# Patient Record
Sex: FEMALE | Race: OTHER | HISPANIC OR LATINO
[De-identification: names, ages, dates, MRNs, and addresses within clinical notes are randomized per-mention and may not be internally consistent; named-entity substitution may affect disease eponyms.]

---

## 2017-07-27 ENCOUNTER — TRANSCRIPTION ENCOUNTER (OUTPATIENT)
Age: 22
End: 2017-07-27

## 2017-10-03 ENCOUNTER — TRANSCRIPTION ENCOUNTER (OUTPATIENT)
Age: 22
End: 2017-10-03

## 2017-10-21 ENCOUNTER — TRANSCRIPTION ENCOUNTER (OUTPATIENT)
Age: 22
End: 2017-10-21

## 2019-06-12 ENCOUNTER — APPOINTMENT (OUTPATIENT)
Dept: OBGYN | Facility: CLINIC | Age: 24
End: 2019-06-12

## 2019-09-19 ENCOUNTER — TRANSCRIPTION ENCOUNTER (OUTPATIENT)
Age: 24
End: 2019-09-19

## 2020-08-14 ENCOUNTER — TRANSCRIPTION ENCOUNTER (OUTPATIENT)
Age: 25
End: 2020-08-14

## 2020-08-31 ENCOUNTER — TRANSCRIPTION ENCOUNTER (OUTPATIENT)
Age: 25
End: 2020-08-31

## 2020-11-09 ENCOUNTER — APPOINTMENT (OUTPATIENT)
Dept: ANTEPARTUM | Facility: CLINIC | Age: 25
End: 2020-11-09
Payer: MEDICAID

## 2020-11-09 ENCOUNTER — TRANSCRIPTION ENCOUNTER (OUTPATIENT)
Age: 25
End: 2020-11-09

## 2020-11-09 ENCOUNTER — ASOB RESULT (OUTPATIENT)
Age: 25
End: 2020-11-09

## 2020-11-09 PROCEDURE — 76801 OB US < 14 WKS SINGLE FETUS: CPT

## 2020-11-09 PROCEDURE — 36416 COLLJ CAPILLARY BLOOD SPEC: CPT

## 2020-11-09 PROCEDURE — 99072 ADDL SUPL MATRL&STAF TM PHE: CPT

## 2020-11-09 PROCEDURE — 76813 OB US NUCHAL MEAS 1 GEST: CPT

## 2020-12-22 ENCOUNTER — ASOB RESULT (OUTPATIENT)
Age: 25
End: 2020-12-22

## 2020-12-22 ENCOUNTER — APPOINTMENT (OUTPATIENT)
Dept: ANTEPARTUM | Facility: CLINIC | Age: 25
End: 2020-12-22
Payer: MEDICAID

## 2020-12-22 PROCEDURE — 76811 OB US DETAILED SNGL FETUS: CPT

## 2020-12-22 PROCEDURE — 99072 ADDL SUPL MATRL&STAF TM PHE: CPT

## 2021-01-11 ENCOUNTER — APPOINTMENT (OUTPATIENT)
Dept: ANTEPARTUM | Facility: CLINIC | Age: 26
End: 2021-01-11
Payer: MEDICAID

## 2021-01-11 ENCOUNTER — ASOB RESULT (OUTPATIENT)
Age: 26
End: 2021-01-11

## 2021-01-11 PROCEDURE — 99072 ADDL SUPL MATRL&STAF TM PHE: CPT

## 2021-01-11 PROCEDURE — 76816 OB US FOLLOW-UP PER FETUS: CPT

## 2021-05-20 ENCOUNTER — INPATIENT (INPATIENT)
Facility: HOSPITAL | Age: 26
LOS: 2 days | Discharge: HOME | End: 2021-05-23
Attending: OBSTETRICS & GYNECOLOGY | Admitting: OBSTETRICS & GYNECOLOGY

## 2021-05-20 ENCOUNTER — EMERGENCY (EMERGENCY)
Facility: HOSPITAL | Age: 26
LOS: 1 days | Discharge: STILL ADMITTED OR ADMINISTRATIVE | End: 2021-05-20
Admitting: EMERGENCY MEDICINE
Payer: MEDICAID

## 2021-05-20 ENCOUNTER — TRANSCRIPTION ENCOUNTER (OUTPATIENT)
Age: 26
End: 2021-05-20

## 2021-05-20 VITALS
SYSTOLIC BLOOD PRESSURE: 102 MMHG | HEIGHT: 66 IN | DIASTOLIC BLOOD PRESSURE: 50 MMHG | OXYGEN SATURATION: 100 % | RESPIRATION RATE: 16 BRPM | HEART RATE: 85 BPM | TEMPERATURE: 98 F

## 2021-05-20 VITALS — TEMPERATURE: 98 F | RESPIRATION RATE: 16 BRPM

## 2021-05-20 DIAGNOSIS — O41.03X0 OLIGOHYDRAMNIOS, THIRD TRIMESTER, NOT APPLICABLE OR UNSPECIFIED: ICD-10-CM

## 2021-05-20 DIAGNOSIS — R82.71 BACTERIURIA: ICD-10-CM

## 2021-05-20 DIAGNOSIS — Z3A.00 WEEKS OF GESTATION OF PREGNANCY NOT SPECIFIED: ICD-10-CM

## 2021-05-20 DIAGNOSIS — O26.899 OTHER SPECIFIED PREGNANCY RELATED CONDITIONS, UNSPECIFIED TRIMESTER: ICD-10-CM

## 2021-05-20 LAB
BASOPHILS # BLD AUTO: 0.02 K/UL — SIGNIFICANT CHANGE UP (ref 0–0.2)
BASOPHILS NFR BLD AUTO: 0.2 % — SIGNIFICANT CHANGE UP (ref 0–2)
BLD GP AB SCN SERPL QL: NEGATIVE — SIGNIFICANT CHANGE UP
COVID-19 SPIKE DOMAIN AB INTERP: POSITIVE
COVID-19 SPIKE DOMAIN ANTIBODY RESULT: 35.9 U/ML — HIGH
EOSINOPHIL # BLD AUTO: 0.08 K/UL — SIGNIFICANT CHANGE UP (ref 0–0.5)
EOSINOPHIL NFR BLD AUTO: 0.7 % — SIGNIFICANT CHANGE UP (ref 0–6)
HCT VFR BLD CALC: 35.7 % — SIGNIFICANT CHANGE UP (ref 34.5–45)
HGB BLD-MCNC: 11.1 G/DL — LOW (ref 11.5–15.5)
IANC: 7.92 K/UL — SIGNIFICANT CHANGE UP (ref 1.5–8.5)
IMM GRANULOCYTES NFR BLD AUTO: 0.6 % — SIGNIFICANT CHANGE UP (ref 0–1.5)
LYMPHOCYTES # BLD AUTO: 2.33 K/UL — SIGNIFICANT CHANGE UP (ref 1–3.3)
LYMPHOCYTES # BLD AUTO: 21 % — SIGNIFICANT CHANGE UP (ref 13–44)
MCHC RBC-ENTMCNC: 28 PG — SIGNIFICANT CHANGE UP (ref 27–34)
MCHC RBC-ENTMCNC: 31.1 GM/DL — LOW (ref 32–36)
MCV RBC AUTO: 89.9 FL — SIGNIFICANT CHANGE UP (ref 80–100)
MONOCYTES # BLD AUTO: 0.65 K/UL — SIGNIFICANT CHANGE UP (ref 0–0.9)
MONOCYTES NFR BLD AUTO: 5.9 % — SIGNIFICANT CHANGE UP (ref 2–14)
NEUTROPHILS # BLD AUTO: 7.92 K/UL — HIGH (ref 1.8–7.4)
NEUTROPHILS NFR BLD AUTO: 71.6 % — SIGNIFICANT CHANGE UP (ref 43–77)
NRBC # BLD: 0 /100 WBCS — SIGNIFICANT CHANGE UP
NRBC # FLD: 0 K/UL — SIGNIFICANT CHANGE UP
PLATELET # BLD AUTO: 263 K/UL — SIGNIFICANT CHANGE UP (ref 150–400)
RBC # BLD: 3.97 M/UL — SIGNIFICANT CHANGE UP (ref 3.8–5.2)
RBC # FLD: 14.1 % — SIGNIFICANT CHANGE UP (ref 10.3–14.5)
RH IG SCN BLD-IMP: NEGATIVE — SIGNIFICANT CHANGE UP
SARS-COV-2 IGG+IGM SERPL QL IA: 35.9 U/ML — HIGH
SARS-COV-2 IGG+IGM SERPL QL IA: POSITIVE
SARS-COV-2 RNA SPEC QL NAA+PROBE: SIGNIFICANT CHANGE UP
WBC # BLD: 11.07 K/UL — HIGH (ref 3.8–10.5)
WBC # FLD AUTO: 11.07 K/UL — HIGH (ref 3.8–10.5)

## 2021-05-20 PROCEDURE — L9993: CPT

## 2021-05-20 RX ORDER — BUTORPHANOL TARTRATE 2 MG/ML
2 INJECTION, SOLUTION INTRAMUSCULAR; INTRAVENOUS ONCE
Refills: 0 | Status: DISCONTINUED | OUTPATIENT
Start: 2021-05-20 | End: 2021-05-20

## 2021-05-20 RX ORDER — AMPICILLIN TRIHYDRATE 250 MG
1 CAPSULE ORAL EVERY 4 HOURS
Refills: 0 | Status: DISCONTINUED | OUTPATIENT
Start: 2021-05-20 | End: 2021-05-21

## 2021-05-20 RX ORDER — AMPICILLIN TRIHYDRATE 250 MG
2 CAPSULE ORAL ONCE
Refills: 0 | Status: COMPLETED | OUTPATIENT
Start: 2021-05-20 | End: 2021-05-20

## 2021-05-20 RX ORDER — SODIUM CHLORIDE 9 MG/ML
1000 INJECTION, SOLUTION INTRAVENOUS
Refills: 0 | Status: DISCONTINUED | OUTPATIENT
Start: 2021-05-20 | End: 2021-05-21

## 2021-05-20 RX ORDER — ONDANSETRON 8 MG/1
4 TABLET, FILM COATED ORAL ONCE
Refills: 0 | Status: COMPLETED | OUTPATIENT
Start: 2021-05-20 | End: 2021-05-20

## 2021-05-20 RX ORDER — OXYTOCIN 10 UNIT/ML
333.33 VIAL (ML) INJECTION
Qty: 20 | Refills: 0 | Status: DISCONTINUED | OUTPATIENT
Start: 2021-05-20 | End: 2021-05-22

## 2021-05-20 RX ADMIN — Medication 216 GRAM(S): at 15:17

## 2021-05-20 RX ADMIN — ONDANSETRON 4 MILLIGRAM(S): 8 TABLET, FILM COATED ORAL at 21:11

## 2021-05-20 RX ADMIN — Medication 108 GRAM(S): at 19:14

## 2021-05-20 RX ADMIN — Medication 108 GRAM(S): at 23:20

## 2021-05-20 RX ADMIN — BUTORPHANOL TARTRATE 2 MILLIGRAM(S): 2 INJECTION, SOLUTION INTRAMUSCULAR; INTRAVENOUS at 21:56

## 2021-05-20 NOTE — ED ADULT TRIAGE NOTE - CHIEF COMPLAINT QUOTE
EDC 21, pt states that she awoke this AM with blood in her bed, pt denies contractions, endorses fetal movement, denies any other fluid leaking

## 2021-05-20 NOTE — OB PROVIDER TRIAGE NOTE - NS_OBGYNHISTORY_OBGYN_ALL_OB_FT
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

## 2021-05-20 NOTE — OB RN PATIENT PROFILE - PMH
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

## 2021-05-20 NOTE — OB PROVIDER TRIAGE NOTE - PMH
Herpes simplex virus (HSV) infection  on Valtrex last outbreak Oct. 2020  UTI (urinary tract infection)  tx in past

## 2021-05-20 NOTE — OB PROVIDER H&P - NS_EFFACEMANT_OBGYN_ALL_OB_NU
0 Tetracycline Counseling: Patient counseled regarding possible photosensitivity and increased risk for sunburn.  Patient instructed to avoid sunlight, if possible.  When exposed to sunlight, patients should wear protective clothing, sunglasses, and sunscreen.  The patient was instructed to call the office immediately if the following severe adverse effects occur:  hearing changes, easy bruising/bleeding, severe headache, or vision changes.  The patient verbalized understanding of the proper use and possible adverse effects of tetracycline.  All of the patient's questions and concerns were addressed. Patient understands to avoid pregnancy while on therapy due to potential birth defects.

## 2021-05-20 NOTE — OB PROVIDER TRIAGE NOTE - HISTORY OF PRESENT ILLNESS
26 y.o.  MYLENE 2021 @ 39.4 weeks presents with c/o bleeding. Pt states she is unsure if vaginal or from hemorrhoid. Pt states she had normal BM without straining this AM prior to noticing spotting on panty liner and on bed. Proteinuria 300 mg on 24 hour urine resulted 2021. Pt denies HA, visual disturbance, RUQ pain, epigastric pain. Pt states she has been monitoring her BPs at home and denies hypertension.    allergies:  bactrim - hives, rash  medications:  prenatal vitamins    med/surg hx:  pea sized hemorrhoid  OBGYN hx:  HSV 10/2020 last outbreak - valcyclovir prophylaxis    26 y.o.  MYLENE 2021 @ 39.4 weeks presents with c/o bleeding. Pt states she is unsure if vaginal or from hemorrhoid. Pt states she had normal BM without straining this AM prior to noticing spotting on panty liner and on bed. Proteinuria 300 mg on 24 hour urine resulted 2021. Pt denies HA, visual disturbance, RUQ pain, epigastric pain. Pt states she has been monitoring her BPs at home and denies hypertension.    Pt has not tested positive for covid 19, pt has not been vaccinated against covid 19    allergies:  bactrim - hives, rash  medications:  prenatal vitamins  valcyclovir 500 mg PO daily    med/surg hx:  pea sized hemorrhoid  OBGYN hx:  HSV 10/2020 last outbreak - valcyclovir prophylaxis    26 y.o.  MYLENE 2021 @ 39.4 weeks presents with c/o bleeding. Pt states she is unsure if vaginal or from hemorrhoid. Pt states she had normal BM without straining this AM prior to noticing spotting on panty liner and on bed. Proteinuria- 300 mg on 24 hour urine resulted 2021. Pt denies HA, visual disturbance, RUQ pain, epigastric pain. Pt states she has been monitoring her BPs at home and denies hypertension.    Pt has not tested positive for covid 19, pt has not been vaccinated against covid 19    allergies:  bactrim - hives, rash  medications:  prenatal vitamins  valcyclovir 500 mg PO daily    med/surg hx:  pea sized hemorrhoid  OBGYN hx:  HSV 10/2020 last outbreak - valcyclovir prophylaxis    26 y.o.  MYLENE 2021 @ 39.4 weeks presents with c/o bleeding. Pt states she is unsure if vaginal or from hemorrhoid. Pt states she had normal BM without straining this AM prior to noticing spotting on panty liner and on bed. Proteinuria- 300 mg on 24 hour urine resulted 2021. Pt denies HA, visual disturbance, RUQ pain, epigastric pain. Pt states she has been monitoring her BPs at home and denies hypertension.    Pt has not tested positive for covid 19, pt has not been vaccinated against covid 19    allergies:  bactrim - hives, rash  medications:  prenatal vitamins  valcyclovir 500 mg PO daily    med/surg hx:  pea sized hemorrhoid  OBGYN hx:  HSV2 10/2020 last outbreak - valcyclovir prophylaxis

## 2021-05-20 NOTE — OB PROVIDER H&P - NSHPPHYSICALEXAM_GEN_ALL_CORE
abdomen soft, nontender  taus: sliup, cephalic presentation, posterior placenta, bpp 6/8, marquise 4.6  sse: pea sized hemorrhoid appears slightly friable, no blood noted in vaginal vault, no lesions noted, small leukorrhea noted  sve: 1/0/-3/I  nst in progress

## 2021-05-20 NOTE — OB PROVIDER H&P - ASSESSMENT
a/p: 26 y.o.  MYLENE 2021 @ 39.4 weeks oligohydramnios, cat 2 FHR    +GBS bacteriuria for ampicillin prophylaxis  covid 19 swab collected and pending  IOL with PO cytotec  Discussed with Dr Warenr and Dr Enciso, PGY-3    JMidy, NP

## 2021-05-20 NOTE — OB RN TRIAGE NOTE - FALLEN IN THE PAST
post op check    BP  ~110  u/o good  drain old blood     foot covered but doesn't appear to have sign foot pain  he will not allow anyone to touch leg or thigh    stable  will check labs  I suspect he will need another unit of blood   no

## 2021-05-20 NOTE — OB PROVIDER TRIAGE NOTE - NSHPPHYSICALEXAM_GEN_ALL_CORE
abdomen soft, nontender  sse: pea sized hemorrhoid appears slightly friable, no blood noted in vaginal vault, no lesions noted, small leukorrhea noted  sve: 1/0/-3/I  nst in progress abdomen soft, nontender  taus: sliup, cephalic presentation, posterior placenta, bpp 8/8, marquise 5.3  sse: pea sized hemorrhoid appears slightly friable, no blood noted in vaginal vault, no lesions noted, small leukorrhea noted  sve: 1/0/-3/I  nst in progress abdomen soft, nontender  taus: sliup, cephalic presentation, posterior placenta, bpp 8/8, marquise 4.6  sse: pea sized hemorrhoid appears slightly friable, no blood noted in vaginal vault, no lesions noted, small leukorrhea noted  sve: 1/0/-3/I  nst in progress abdomen soft, nontender  taus: sliup, cephalic presentation, posterior placenta, bpp 6/8, marquise 4.6  sse: pea sized hemorrhoid appears slightly friable, no blood noted in vaginal vault, no lesions noted, small leukorrhea noted  sve: 1/0/-3/I  nst in progress

## 2021-05-20 NOTE — OB RN TRIAGE NOTE - PMH
Herpes simplex virus (HSV) infection  on Valtrex last outbreak  UTI (urinary tract infection)     Herpes simplex virus (HSV) infection  on Valtrex last outbreak Oct. 2020  UTI (urinary tract infection)  tx in past

## 2021-05-20 NOTE — OB PROVIDER H&P - HISTORY OF PRESENT ILLNESS
26 y.o.  MYLENE 2021 @ 39.4 weeks presents with c/o bleeding. Pt states she is unsure if vaginal or from hemorrhoid. Pt states she had normal BM without straining this AM prior to noticing spotting on panty liner and on bed. Proteinuria- 300 mg on 24 hour urine resulted 2021. Pt denies HA, visual disturbance, RUQ pain, epigastric pain. Pt states she has been monitoring her BPs at home and denies hypertension.    Pt has not tested positive for covid 19, pt has not been vaccinated against covid 19    allergies:  bactrim - hives, rash  medications:  prenatal vitamins  valcyclovir 500 mg PO daily    med/surg hx:  pea sized hemorrhoid  OBGYN hx:  HSV2   10/2020 last outbreak - valcyclovir prophylaxis

## 2021-05-21 ENCOUNTER — TRANSCRIPTION ENCOUNTER (OUTPATIENT)
Age: 26
End: 2021-05-21

## 2021-05-21 DIAGNOSIS — O41.00X0 OLIGOHYDRAMNIOS, UNSPECIFIED TRIMESTER, NOT APPLICABLE OR UNSPECIFIED: ICD-10-CM

## 2021-05-21 LAB — T PALLIDUM AB TITR SER: NEGATIVE — SIGNIFICANT CHANGE UP

## 2021-05-21 RX ORDER — OXYCODONE HYDROCHLORIDE 5 MG/1
5 TABLET ORAL
Refills: 0 | Status: DISCONTINUED | OUTPATIENT
Start: 2021-05-21 | End: 2021-05-23

## 2021-05-21 RX ORDER — OXYTOCIN 10 UNIT/ML
2 VIAL (ML) INJECTION
Qty: 30 | Refills: 0 | Status: DISCONTINUED | OUTPATIENT
Start: 2021-05-21 | End: 2021-05-22

## 2021-05-21 RX ORDER — OXYTOCIN 10 UNIT/ML
333.33 VIAL (ML) INJECTION
Qty: 20 | Refills: 0 | Status: DISCONTINUED | OUTPATIENT
Start: 2021-05-21 | End: 2021-05-22

## 2021-05-21 RX ORDER — SODIUM CHLORIDE 9 MG/ML
1000 INJECTION, SOLUTION INTRAVENOUS
Refills: 0 | Status: DISCONTINUED | OUTPATIENT
Start: 2021-05-21 | End: 2021-05-22

## 2021-05-21 RX ORDER — ERTAPENEM SODIUM 1 G/1
INJECTION, POWDER, LYOPHILIZED, FOR SOLUTION INTRAMUSCULAR; INTRAVENOUS
Refills: 0 | Status: DISCONTINUED | OUTPATIENT
Start: 2021-05-21 | End: 2021-05-23

## 2021-05-21 RX ORDER — SIMETHICONE 80 MG/1
80 TABLET, CHEWABLE ORAL EVERY 4 HOURS
Refills: 0 | Status: DISCONTINUED | OUTPATIENT
Start: 2021-05-21 | End: 2021-05-23

## 2021-05-21 RX ORDER — ACETAMINOPHEN 500 MG
975 TABLET ORAL
Refills: 0 | Status: DISCONTINUED | OUTPATIENT
Start: 2021-05-21 | End: 2021-05-23

## 2021-05-21 RX ORDER — KETOROLAC TROMETHAMINE 30 MG/ML
30 SYRINGE (ML) INJECTION EVERY 6 HOURS
Refills: 0 | Status: DISCONTINUED | OUTPATIENT
Start: 2021-05-21 | End: 2021-05-22

## 2021-05-21 RX ORDER — LANOLIN
1 OINTMENT (GRAM) TOPICAL EVERY 6 HOURS
Refills: 0 | Status: DISCONTINUED | OUTPATIENT
Start: 2021-05-21 | End: 2021-05-23

## 2021-05-21 RX ORDER — IBUPROFEN 200 MG
600 TABLET ORAL EVERY 6 HOURS
Refills: 0 | Status: COMPLETED | OUTPATIENT
Start: 2021-05-21 | End: 2022-04-19

## 2021-05-21 RX ORDER — ERTAPENEM SODIUM 1 G/1
1000 INJECTION, POWDER, LYOPHILIZED, FOR SOLUTION INTRAMUSCULAR; INTRAVENOUS ONCE
Refills: 0 | Status: COMPLETED | OUTPATIENT
Start: 2021-05-21 | End: 2021-05-21

## 2021-05-21 RX ORDER — TETANUS TOXOID, REDUCED DIPHTHERIA TOXOID AND ACELLULAR PERTUSSIS VACCINE, ADSORBED 5; 2.5; 8; 8; 2.5 [IU]/.5ML; [IU]/.5ML; UG/.5ML; UG/.5ML; UG/.5ML
0.5 SUSPENSION INTRAMUSCULAR ONCE
Refills: 0 | Status: DISCONTINUED | OUTPATIENT
Start: 2021-05-21 | End: 2021-05-23

## 2021-05-21 RX ORDER — ERTAPENEM SODIUM 1 G/1
1000 INJECTION, POWDER, LYOPHILIZED, FOR SOLUTION INTRAMUSCULAR; INTRAVENOUS EVERY 24 HOURS
Refills: 0 | Status: DISCONTINUED | OUTPATIENT
Start: 2021-05-22 | End: 2021-05-23

## 2021-05-21 RX ORDER — VALACYCLOVIR 500 MG/1
1 TABLET, FILM COATED ORAL
Qty: 0 | Refills: 0 | DISCHARGE

## 2021-05-21 RX ORDER — DIPHENHYDRAMINE HCL 50 MG
25 CAPSULE ORAL EVERY 6 HOURS
Refills: 0 | Status: COMPLETED | OUTPATIENT
Start: 2021-05-21 | End: 2022-04-19

## 2021-05-21 RX ORDER — OXYCODONE HYDROCHLORIDE 5 MG/1
5 TABLET ORAL ONCE
Refills: 0 | Status: DISCONTINUED | OUTPATIENT
Start: 2021-05-21 | End: 2021-05-23

## 2021-05-21 RX ORDER — MAGNESIUM HYDROXIDE 400 MG/1
30 TABLET, CHEWABLE ORAL
Refills: 0 | Status: DISCONTINUED | OUTPATIENT
Start: 2021-05-21 | End: 2021-05-23

## 2021-05-21 RX ORDER — HEPARIN SODIUM 5000 [USP'U]/ML
5000 INJECTION INTRAVENOUS; SUBCUTANEOUS EVERY 12 HOURS
Refills: 0 | Status: DISCONTINUED | OUTPATIENT
Start: 2021-05-22 | End: 2021-05-23

## 2021-05-21 RX ADMIN — BUTORPHANOL TARTRATE 2 MILLIGRAM(S): 2 INJECTION, SOLUTION INTRAMUSCULAR; INTRAVENOUS at 00:30

## 2021-05-21 RX ADMIN — Medication 108 GRAM(S): at 03:18

## 2021-05-21 RX ADMIN — Medication 2 MILLIUNIT(S)/MIN: at 05:06

## 2021-05-21 RX ADMIN — Medication 975 MILLIGRAM(S): at 23:01

## 2021-05-21 RX ADMIN — Medication 108 GRAM(S): at 07:20

## 2021-05-21 RX ADMIN — Medication 1000 MILLIUNIT(S)/MIN: at 23:01

## 2021-05-21 RX ADMIN — Medication 108 GRAM(S): at 17:51

## 2021-05-21 RX ADMIN — ERTAPENEM SODIUM 120 MILLIGRAM(S): 1 INJECTION, POWDER, LYOPHILIZED, FOR SOLUTION INTRAMUSCULAR; INTRAVENOUS at 21:10

## 2021-05-21 RX ADMIN — Medication 975 MILLIGRAM(S): at 22:02

## 2021-05-21 RX ADMIN — Medication 108 GRAM(S): at 11:40

## 2021-05-21 NOTE — OB RN INTRAOPERATIVE NOTE - NS_DELIVERYATTENDING1_OBGYN_ALL_OB_FT
What Type Of Note Output Would You Prefer (Optional)?: Standard Output Hpi Title: Evaluation of Skin Lesions How Severe Are Your Spot(S)?: mild Have Your Spot(S) Been Treated In The Past?: has not been treated Family Member: Father Renuka Celestin MD

## 2021-05-21 NOTE — OB RN DELIVERY SUMMARY - NSSELHIDDEN_OBGYN_ALL_OB_FT
[NS_DeliveryAttending1_OBGYN_ALL_OB_FT:[id number]],[NS_DeliveryAssist1_OBGYN_ALL_OB_FT:[id number]],[NS_DeliveryRN_OBGYN_ALL_OB_FT:[id number]]

## 2021-05-21 NOTE — OB PROVIDER LABOR PROGRESS NOTE - NS_SUBJECTIVE/OBJECTIVE_OBGYN_ALL_OB_FT
examined after nurse reported displacement of CB
Patient reports feeling well.
Pt seen and examined at bedside
R1 Labor Note    S: Patient evaluated at bedside for cervical change.     O:  T(C): 36.8 (05-20-21 @ 18:47), Max: 37 (05-20-21 @ 12:35)  HR: 74 (05-20-21 @ 18:47) (73 - 88)  BP: 109/71 (05-20-21 @ 18:47) (93/53 - 127/79)  RR: 12 (05-20-21 @ 15:29) (12 - 17)  SpO2: 100% (05-20-21 @ 11:54) (100% - 100%)
Patient seen and examined at bedside. Patient reports feeling much more comfortable with the epidural in place

## 2021-05-21 NOTE — OB RN INTRAOPERATIVE NOTE - NSSELHIDDEN_OBGYN_ALL_OB_FT
[NS_DeliveryAttending1_OBGYN_ALL_OB_FT:[id number]],[NS_DeliveryAssist1_OBGYN_ALL_OB_FT:[id number]] [NS_DeliveryAttending1_OBGYN_ALL_OB_FT:[id number]],[NS_DeliveryAssist1_OBGYN_ALL_OB_FT:[id number]],[NS_DeliveryRN_OBGYN_ALL_OB_FT:[id number]]

## 2021-05-21 NOTE — CHART NOTE - NSCHARTNOTEFT_GEN_A_CORE
R1 Chart Note     Patient was seen and examined at bedside for replacement of IUPC as it was noted to be in the vaginal canal.     Exam 5/80/-3   FHT: Baseline 145, Moderate Variability, + Accels, - Decels   Dorris: q 3 minutes     Continue on Induction course at this time    d/w Dr. Lizandro Gupta, PGY-1

## 2021-05-21 NOTE — DISCHARGE NOTE OB - CARE PLAN
Principal Discharge DX:	 delivery delivered  Goal:	Routine recovery  Assessment and plan of treatment:	Routine postop care

## 2021-05-21 NOTE — DISCHARGE NOTE OB - CARE PROVIDER_API CALL
Renuka Celestin)  [address], [address] [place] [address]  Phone: [phone]  Fax: [phone]  Follow Up Time:

## 2021-05-21 NOTE — OB NEONATOLOGY/PEDIATRICIAN DELIVERY SUMMARY - NSPEDSNEONOTESA_OBGYN_ALL_OB_FT
Called to delivery for CS of NRFHT. Baby boy born at 40wks to a 27y/o  O- blood type mother. Maternal history of HSV with SSE on Valtrex. Prenatal history unremarkable. PNL nr/-, Rubella equivocal, GBS + on  in the urine, treated with ampicillin. ROM at 1158 with clear fluids. Baby emerged vigorous, crying, was w/d/s/s with APGARS of 8/9. Poor color at 2MOL so CPAP 5/40% started for 2 minutes before goal sats reached and discontinued. Mom would like to breast feed, consents Hep B and consents circ. EOS 0.23, maternal temp 37.7. Mother COVID status negative.

## 2021-05-21 NOTE — CHART NOTE - NSCHARTNOTEFT_GEN_A_CORE
Pt seen and examined. Pit at 6. Pt subjectively feels warm. Rectal temp 37.7C    SVE 5/70/-2, caput  FHT: 170s, min variabillity, -accels, -decels   Tocos: q1-2min    Will turn off pitocin  High fowlers  pt has been 5cm x 12pm, now with cat II FHT  Will proceed with c/s delivery  Discussed R/B/A inclusive of but not limited to infection, transfusion risk and nearby organ injury risk.  Pt conveyed understanding, consent obtained.  Anesthesia and nursing made aware.

## 2021-05-21 NOTE — OB PROVIDER LABOR PROGRESS NOTE - NS_OBIHIFHRDETAILS_OBGYN_ALL_OB_FT
140 bpm/ mod onesimo/ + accels/ - decels
145, moderate, + accels, no decels
140 bpm/ mod onesimo/ + accels/ - decels
140, mod, +accels, -decels

## 2021-05-21 NOTE — OB PROVIDER LABOR PROGRESS NOTE - ASSESSMENT
-cat 1 tracing  -sppo/cb  -4Pit    [place] PGY1  d/w Dr. Yates
A/P 26y P0 @ 39 wks 6 days IOL  -IOL: Continue on PO  -CB placed   -Cat 1 tracing  -Analgesia: None at this time  -Anticipate     d/w Dr. Alana Gupta PGY-1  
25 y/o P0 at 40wks admitted for IOL for Cat II tracing.   Category 1 tracing  Continue Pitocin - currently at 8 - decreased from 12 as patient was tachysystole  IUPC in place - [facility] - Not adequate  AMP for GBS pos  Left lateral with Peanut Ball in place  
25 y/o P0 at 40wks admitted for IOL for Cat II tracing. AROM performed earlier- clear fluid. IUPC placed secondary to difficulties with picking up contractions.  Category 1 tracing  Continue Pitocin  Peanut Ball in place  Anticipate 
q/ PO cytotec    d/w Dr. Trudy Cooper, PGY2

## 2021-05-21 NOTE — OB RN DELIVERY SUMMARY - NS_SEPSISRSKCALC_OBGYN_ALL_OB_FT
EOS calculated successfully. EOS Risk Factor: 0.5/1000 live births ([place] national incidence); GA=40w;Temp=99.86; ROM=7.767; GBS='Positive'; Antibiotics='GBS specific antibiotics > 2 hrs prior to birth'

## 2021-05-21 NOTE — DISCHARGE NOTE OB - MEDICATION SUMMARY - MEDICATIONS TO TAKE
I will START or STAY ON the medications listed below when I get home from the hospital:  None I will START or STAY ON the medications listed below when I get home from the hospital:    ibuprofen 600 mg oral tablet  -- 1 tab(s) by mouth every 6 hours, As Needed  -- Indication: For pain    acetaminophen 325 mg oral tablet  -- 3 tab(s) by mouth , As Needed  -- Indication: For pain   I will START or STAY ON the medications listed below when I get home from the hospital:    acetaminophen 325 mg oral tablet  -- 3 tab(s) by mouth every 6 hours   -- Indication: For pain    ibuprofen 600 mg oral tablet  -- 1 tab(s) by mouth every 6 hours  -- Indication: For pain    magnesium hydroxide 8% oral suspension  -- 30 milliliter(s) by mouth 2 times a day, As needed, Constipation  -- Indication: For Constipation    ferrous sulfate 325 mg (65 mg elemental iron) oral tablet  -- 1 tab(s) by mouth 3 times a day  -- Indication: For iron    Prenatal Multivitamins with Folic Acid 1 mg oral tablet  -- 1 tab(s) by mouth once a day  -- Indication: For vitamins    simethicone 80 mg oral tablet, chewable  -- 1 tab(s) by mouth every 4 hours, As needed, Gas  -- Indication: For Gas

## 2021-05-21 NOTE — DISCHARGE NOTE OB - PATIENT PORTAL LINK FT
You can access the FollowMyHealth Patient Portal offered by [facility] by registering at the following website: [email or web address]. By joining [facility]’s FollowMyHealth portal, you will also be able to view your health information using other applications (apps) compatible with our system.

## 2021-05-21 NOTE — OB PROVIDER DELIVERY SUMMARY - NSPROVIDERDELIVERYNOTE_OBGYN_ALL_OB_FT
Due to cat II FHR, decision made for pLTCS at 5cm dilated. Gravid uterus with grossly normal appearing bilateral fallopian tubes and ovaries. Liveborn male infant delivered from ROT position with nuchal x1 reduced, apgars 8/9 weighing 3390g. Placenta delivered spontaneously and uterine atony noted afterwards for which methergine IM x1 given. Uterus was firm and hysterotomy closed in single layer of Vicryl.   qBL: 428cc  IVF: 1500cc  UOP: 325cc

## 2021-05-21 NOTE — OB PROVIDER DELIVERY SUMMARY - NSSELHIDDEN_OBGYN_ALL_OB_FT
[NS_DeliveryAttending1_OBGYN_ALL_OB_FT:[id number]],[NS_DeliveryAssist1_OBGYN_ALL_OB_FT:[id number]],[NS_DeliveryRN_OBGYN_ALL_OB_FT:[id number]]

## 2021-05-22 LAB
BASOPHILS # BLD AUTO: 0.03 K/UL — SIGNIFICANT CHANGE UP (ref 0–0.2)
BASOPHILS NFR BLD AUTO: 0.1 % — SIGNIFICANT CHANGE UP (ref 0–2)
EOSINOPHIL # BLD AUTO: 0 K/UL — SIGNIFICANT CHANGE UP (ref 0–0.5)
EOSINOPHIL NFR BLD AUTO: 0 % — SIGNIFICANT CHANGE UP (ref 0–6)
HCT VFR BLD CALC: 29.6 % — LOW (ref 34.5–45)
HGB BLD-MCNC: 9.4 G/DL — LOW (ref 11.5–15.5)
IANC: 21.13 K/UL — HIGH (ref 1.5–8.5)
IMM GRANULOCYTES NFR BLD AUTO: 0.5 % — SIGNIFICANT CHANGE UP (ref 0–1.5)
LYMPHOCYTES # BLD AUTO: 1.61 K/UL — SIGNIFICANT CHANGE UP (ref 1–3.3)
LYMPHOCYTES # BLD AUTO: 6.7 % — LOW (ref 13–44)
MCHC RBC-ENTMCNC: 28.7 PG — SIGNIFICANT CHANGE UP (ref 27–34)
MCHC RBC-ENTMCNC: 31.8 GM/DL — LOW (ref 32–36)
MCV RBC AUTO: 90.5 FL — SIGNIFICANT CHANGE UP (ref 80–100)
MONOCYTES # BLD AUTO: 1.08 K/UL — HIGH (ref 0–0.9)
MONOCYTES NFR BLD AUTO: 4.5 % — SIGNIFICANT CHANGE UP (ref 2–14)
NEUTROPHILS # BLD AUTO: 21.13 K/UL — HIGH (ref 1.8–7.4)
NEUTROPHILS NFR BLD AUTO: 88.2 % — HIGH (ref 43–77)
NRBC # BLD: 0 /100 WBCS — SIGNIFICANT CHANGE UP
NRBC # FLD: 0 K/UL — SIGNIFICANT CHANGE UP
PLATELET # BLD AUTO: 238 K/UL — SIGNIFICANT CHANGE UP (ref 150–400)
RBC # BLD: 3.27 M/UL — LOW (ref 3.8–5.2)
RBC # FLD: 14.2 % — SIGNIFICANT CHANGE UP (ref 10.3–14.5)
WBC # BLD: 23.97 K/UL — HIGH (ref 3.8–10.5)
WBC # FLD AUTO: 23.97 K/UL — HIGH (ref 3.8–10.5)

## 2021-05-22 RX ORDER — ACETAMINOPHEN 500 MG
3 TABLET ORAL
Qty: 0 | Refills: 0 | DISCHARGE
Start: 2021-05-22

## 2021-05-22 RX ORDER — DIPHENHYDRAMINE HCL 50 MG
25 CAPSULE ORAL EVERY 6 HOURS
Refills: 0 | Status: DISCONTINUED | OUTPATIENT
Start: 2021-05-22 | End: 2021-05-23

## 2021-05-22 RX ORDER — IBUPROFEN 200 MG
1 TABLET ORAL
Qty: 0 | Refills: 0 | DISCHARGE
Start: 2021-05-22

## 2021-05-22 RX ORDER — SENNA PLUS 8.6 MG/1
1 TABLET ORAL
Refills: 0 | Status: DISCONTINUED | OUTPATIENT
Start: 2021-05-22 | End: 2021-05-22

## 2021-05-22 RX ORDER — FERROUS SULFATE 325(65) MG
325 TABLET ORAL DAILY
Refills: 0 | Status: DISCONTINUED | OUTPATIENT
Start: 2021-05-22 | End: 2021-05-22

## 2021-05-22 RX ORDER — SENNA PLUS 8.6 MG/1
1 TABLET ORAL
Refills: 0 | Status: DISCONTINUED | OUTPATIENT
Start: 2021-05-22 | End: 2021-05-23

## 2021-05-22 RX ORDER — IBUPROFEN 200 MG
600 TABLET ORAL EVERY 6 HOURS
Refills: 0 | Status: DISCONTINUED | OUTPATIENT
Start: 2021-05-22 | End: 2021-05-23

## 2021-05-22 RX ORDER — FERROUS SULFATE 325(65) MG
325 TABLET ORAL THREE TIMES A DAY
Refills: 0 | Status: DISCONTINUED | OUTPATIENT
Start: 2021-05-22 | End: 2021-05-23

## 2021-05-22 RX ADMIN — Medication 30 MILLIGRAM(S): at 11:07

## 2021-05-22 RX ADMIN — Medication 975 MILLIGRAM(S): at 13:16

## 2021-05-22 RX ADMIN — HEPARIN SODIUM 5000 UNIT(S): 5000 INJECTION INTRAVENOUS; SUBCUTANEOUS at 04:30

## 2021-05-22 RX ADMIN — SIMETHICONE 80 MILLIGRAM(S): 80 TABLET, CHEWABLE ORAL at 12:17

## 2021-05-22 RX ADMIN — HEPARIN SODIUM 5000 UNIT(S): 5000 INJECTION INTRAVENOUS; SUBCUTANEOUS at 16:08

## 2021-05-22 RX ADMIN — Medication 325 MILLIGRAM(S): at 12:17

## 2021-05-22 RX ADMIN — Medication 975 MILLIGRAM(S): at 06:45

## 2021-05-22 RX ADMIN — Medication 30 MILLIGRAM(S): at 16:08

## 2021-05-22 RX ADMIN — Medication 975 MILLIGRAM(S): at 18:17

## 2021-05-22 RX ADMIN — ERTAPENEM SODIUM 120 MILLIGRAM(S): 1 INJECTION, POWDER, LYOPHILIZED, FOR SOLUTION INTRAMUSCULAR; INTRAVENOUS at 21:30

## 2021-05-22 RX ADMIN — SIMETHICONE 80 MILLIGRAM(S): 80 TABLET, CHEWABLE ORAL at 06:45

## 2021-05-22 RX ADMIN — Medication 975 MILLIGRAM(S): at 07:15

## 2021-05-22 RX ADMIN — SENNA PLUS 1 TABLET(S): 8.6 TABLET ORAL at 12:17

## 2021-05-22 RX ADMIN — Medication 600 MILLIGRAM(S): at 23:28

## 2021-05-22 RX ADMIN — Medication 975 MILLIGRAM(S): at 12:16

## 2021-05-22 RX ADMIN — Medication 1 TABLET(S): at 12:17

## 2021-05-22 RX ADMIN — Medication 30 MILLIGRAM(S): at 10:07

## 2021-05-22 RX ADMIN — Medication 30 MILLIGRAM(S): at 17:08

## 2021-05-22 NOTE — PROGRESS NOTE ADULT - SUBJECTIVE AND OBJECTIVE BOX
Patient seen and examined at bedside, no acute overnight events. No acute complaints, pain well controlled. Patient is ambulating and tolerating regular diet. Denies CP, SOB, N/V, HA, blurred vision, epigastric pain.    Vital Signs Last 24 Hours  T(C): 37.6 (05-22-21 @ 05:57), Max: 38.3 (05-21-21 @ 20:45)  HR: 94 (05-22-21 @ 05:57) (69 - 111)  BP: 105/63 (05-22-21 @ 05:57) (77/48 - 116/71)  RR: 18 (05-22-21 @ 05:57) (17 - 20)  SpO2: 97% (05-22-21 @ 05:57) (88% - 100%)    I&O's Summary  21 May 2021 07:01  -  22 May 2021 07:00  --------------------------------------------------------  IN: 3600 mL / OUT: 3953 mL / NET: -353 mL      Physical Exam:  General: NAD  Abdomen: Soft, non-tender, non-distended, fundus firm  Incision: Pfannenstiel incision CDI, subcuticular suture closure  Pelvic: Lochia wnl    Labs:  Blood Type: O Negative  Antibody Screen: --  RPR: Negative               9.4    23.97 )-----------( 238      ( 05-22 @ 07:15 )             29.6                11.1   11.07 )-----------( 263      ( 05-20 @ 15:07 )             35.7         MEDICATIONS  (STANDING):  acetaminophen   Tablet .. 975 milliGRAM(s) Oral <User Schedule>  diphtheria/tetanus/pertussis (acellular) Vaccine (ADAcel) 0.5 milliLiter(s) IntraMuscular once  ertapenem  IVPB      ertapenem  IVPB 1000 milliGRAM(s) IV Intermittent every 24 hours  ferrous    sulfate 325 milliGRAM(s) Oral daily  heparin   Injectable 5000 Unit(s) SubCutaneous every 12 hours  ibuprofen  Tablet. 600 milliGRAM(s) Oral every 6 hours  ketorolac   Injectable 30 milliGRAM(s) IV Push every 6 hours  prenatal multivitamin 1 Tablet(s) Oral daily    MEDICATIONS  (PRN):  diphenhydrAMINE 25 milliGRAM(s) Oral every 6 hours PRN Pruritus  lanolin Ointment 1 Application(s) Topical every 6 hours PRN Sore Nipples  magnesium hydroxide Suspension 30 milliLiter(s) Oral two times a day PRN Constipation  oxyCODONE    IR 5 milliGRAM(s) Oral every 3 hours PRN Moderate to Severe Pain (4-10)  oxyCODONE    IR 5 milliGRAM(s) Oral once PRN Moderate to Severe Pain (4-10)  senna 1 Tablet(s) Oral two times a day PRN Constipation  simethicone 80 milliGRAM(s) Chew every 4 hours PRN Gas   Patient seen and examined at bedside, no acute overnight events. No acute complaints, pain well controlled. Patient is ambulating and tolerating regular diet. Denies CP, SOB, N/V, HA, blurred vision, epigastric pain. Up to bathroom to void prior to evaluation, she is passing gas regularly. No bowel movement yet.     Vital Signs Last 24 Hours  T(C): 37.6 (05-22-21 @ 05:57), Max: 38.3 (05-21-21 @ 20:45)  HR: 94 (05-22-21 @ 05:57) (69 - 111)  BP: 105/63 (05-22-21 @ 05:57) (77/48 - 116/71)  RR: 18 (05-22-21 @ 05:57) (17 - 20)  SpO2: 97% (05-22-21 @ 05:57) (88% - 100%)    I&O's Summary  21 May 2021 07:01  -  22 May 2021 07:00  --------------------------------------------------------  IN: 3600 mL / OUT: 3953 mL / NET: -353 mL      Physical Exam:  General: NAD  Abdomen: Soft, non-tender, non-distended, fundus firm  Incision: Pfannenstiel incision CDI, subcuticular suture closure  Pelvic: Lochia wnl    Labs:  Blood Type: O Negative  Antibody Screen: --  RPR: Negative               9.4    23.97 )-----------( 238      ( 05-22 @ 07:15 )             29.6                11.1   11.07 )-----------( 263      ( 05-20 @ 15:07 )             35.7         MEDICATIONS  (STANDING):  acetaminophen   Tablet .. 975 milliGRAM(s) Oral <User Schedule>  diphtheria/tetanus/pertussis (acellular) Vaccine (ADAcel) 0.5 milliLiter(s) IntraMuscular once  ertapenem  IVPB      ertapenem  IVPB 1000 milliGRAM(s) IV Intermittent every 24 hours  ferrous    sulfate 325 milliGRAM(s) Oral daily  heparin   Injectable 5000 Unit(s) SubCutaneous every 12 hours  ibuprofen  Tablet. 600 milliGRAM(s) Oral every 6 hours  ketorolac   Injectable 30 milliGRAM(s) IV Push every 6 hours  prenatal multivitamin 1 Tablet(s) Oral daily    MEDICATIONS  (PRN):  diphenhydrAMINE 25 milliGRAM(s) Oral every 6 hours PRN Pruritus  lanolin Ointment 1 Application(s) Topical every 6 hours PRN Sore Nipples  magnesium hydroxide Suspension 30 milliLiter(s) Oral two times a day PRN Constipation  oxyCODONE    IR 5 milliGRAM(s) Oral every 3 hours PRN Moderate to Severe Pain (4-10)  oxyCODONE    IR 5 milliGRAM(s) Oral once PRN Moderate to Severe Pain (4-10)  senna 1 Tablet(s) Oral two times a day PRN Constipation  simethicone 80 milliGRAM(s) Chew every 4 hours PRN Gas

## 2021-05-22 NOTE — PROGRESS NOTE ADULT - ATTENDING COMMENTS
Agree with above resident assessment and plan. Patient meeting PP milestones. Encourage ambulation, ensuring adequate pain control. Patient feeling well. OK to shower. Reviewed wound care. Continue postpartum care. All questions answered. Anticipate discharge home tomorrow. Follow up in the office in 2 weeks for PP checkup/incision check. Agree with above resident assessment and plan. Patient meeting PP milestones. Encourage ambulation, ensuring adequate pain control. Patient feeling well. OK to shower. Patient with postpartum fever 38.3 5/21 8:45 pm, for Invanz x 24 hrs. Monitor for signs of infection, reviewed to alert RN if feeling warm, chills, aches. CBC notable for WBC count 21. Trend CBC tomorrow.  Reviewed wound care. Continue postpartum care. Rh negative, for Rhogam if baby RH pos, patient aware. All questions answered. Anticipate discharge home tomorrow. Follow up in the office in 2 weeks for PP checkup/incision check.

## 2021-05-22 NOTE — PROGRESS NOTE ADULT - ASSESSMENT
25y/o POD#1 s/p pLTCS c/b endometritis s/p Invanzin stable condition. Patient is progressing well, meeting appropriate postpartum milestones. Tolerating PO, no N/V. Ambulating without difficulty.

## 2021-05-22 NOTE — PROGRESS NOTE ADULT - PROBLEM SELECTOR PLAN 1
- Continue with po analgesia  - Increase ambulation  - Continue regular diet   - Check CBC  - Incision dressing removed    FIDE Castro, PGY-1

## 2021-05-23 VITALS
DIASTOLIC BLOOD PRESSURE: 62 MMHG | OXYGEN SATURATION: 100 % | TEMPERATURE: 98 F | HEART RATE: 93 BPM | SYSTOLIC BLOOD PRESSURE: 108 MMHG | RESPIRATION RATE: 18 BRPM

## 2021-05-23 LAB
BASOPHILS # BLD AUTO: 0.01 K/UL — SIGNIFICANT CHANGE UP (ref 0–0.2)
BASOPHILS NFR BLD AUTO: 0.1 % — SIGNIFICANT CHANGE UP (ref 0–2)
EOSINOPHIL # BLD AUTO: 0.11 K/UL — SIGNIFICANT CHANGE UP (ref 0–0.5)
EOSINOPHIL NFR BLD AUTO: 0.9 % — SIGNIFICANT CHANGE UP (ref 0–6)
HCT VFR BLD CALC: 25.7 % — LOW (ref 34.5–45)
HGB BLD-MCNC: 7.9 G/DL — LOW (ref 11.5–15.5)
IANC: 8.9 K/UL — HIGH (ref 1.5–8.5)
IMM GRANULOCYTES NFR BLD AUTO: 0.6 % — SIGNIFICANT CHANGE UP (ref 0–1.5)
KLEIHAUER-BETKE CALCULATION: 0 % — SIGNIFICANT CHANGE UP
LYMPHOCYTES # BLD AUTO: 2.82 K/UL — SIGNIFICANT CHANGE UP (ref 1–3.3)
LYMPHOCYTES # BLD AUTO: 22.3 % — SIGNIFICANT CHANGE UP (ref 13–44)
MCHC RBC-ENTMCNC: 27.8 PG — SIGNIFICANT CHANGE UP (ref 27–34)
MCHC RBC-ENTMCNC: 30.7 GM/DL — LOW (ref 32–36)
MCV RBC AUTO: 90.5 FL — SIGNIFICANT CHANGE UP (ref 80–100)
MONOCYTES # BLD AUTO: 0.74 K/UL — SIGNIFICANT CHANGE UP (ref 0–0.9)
MONOCYTES NFR BLD AUTO: 5.8 % — SIGNIFICANT CHANGE UP (ref 2–14)
NEUTROPHILS # BLD AUTO: 8.9 K/UL — HIGH (ref 1.8–7.4)
NEUTROPHILS NFR BLD AUTO: 70.3 % — SIGNIFICANT CHANGE UP (ref 43–77)
NRBC # BLD: 0 /100 WBCS — SIGNIFICANT CHANGE UP
NRBC # FLD: 0 K/UL — SIGNIFICANT CHANGE UP
PLATELET # BLD AUTO: 231 K/UL — SIGNIFICANT CHANGE UP (ref 150–400)
RBC # BLD: 2.84 M/UL — LOW (ref 3.8–5.2)
RBC # FLD: 14.6 % — HIGH (ref 10.3–14.5)
WBC # BLD: 12.66 K/UL — HIGH (ref 3.8–10.5)
WBC # FLD AUTO: 12.66 K/UL — HIGH (ref 3.8–10.5)

## 2021-05-23 RX ORDER — ACETAMINOPHEN 500 MG
3 TABLET ORAL
Qty: 168 | Refills: 0
Start: 2021-05-23 | End: 2021-06-05

## 2021-05-23 RX ORDER — IBUPROFEN 200 MG
1 TABLET ORAL
Qty: 56 | Refills: 0
Start: 2021-05-23 | End: 2021-06-05

## 2021-05-23 RX ORDER — MAGNESIUM HYDROXIDE 400 MG/1
30 TABLET, CHEWABLE ORAL
Qty: 840 | Refills: 0
Start: 2021-05-23 | End: 2021-06-05

## 2021-05-23 RX ORDER — SIMETHICONE 80 MG/1
1 TABLET, CHEWABLE ORAL
Qty: 84 | Refills: 0
Start: 2021-05-23 | End: 2021-06-05

## 2021-05-23 RX ORDER — FERROUS SULFATE 325(65) MG
1 TABLET ORAL
Qty: 117 | Refills: 3
Start: 2021-05-23 | End: 2021-10-25

## 2021-05-23 RX ADMIN — Medication 600 MILLIGRAM(S): at 00:30

## 2021-05-23 RX ADMIN — Medication 600 MILLIGRAM(S): at 06:38

## 2021-05-23 RX ADMIN — Medication 325 MILLIGRAM(S): at 06:38

## 2021-05-23 RX ADMIN — Medication 600 MILLIGRAM(S): at 13:54

## 2021-05-23 RX ADMIN — HEPARIN SODIUM 5000 UNIT(S): 5000 INJECTION INTRAVENOUS; SUBCUTANEOUS at 06:38

## 2021-05-23 NOTE — PROGRESS NOTE ADULT - SUBJECTIVE AND OBJECTIVE BOX
Post-Operative Note, C/S  She is a  26y woman who is now post-operative day: 2 s/p PCS for Cat 2/arrest.  ml. The patient feels well. reports mild incisional pain s/p Motrin overnight, requesting tylenol now, RN aware. She is ambulating.  She is tolerating regular diet. She denies nausea and vomiting; denies fever. She is voiding. She reports normal postpartum bleeding. She is breastfeeding. She is formula feeding. Desires circ for baby boy.     Physical exam:    Vital Signs Last 24 Hrs  T(C): 36.5 (23 May 2021 06:10), Max: 36.9 (22 May 2021 10:00)  T(F): 97.7 (23 May 2021 06:10), Max: 98.4 (22 May 2021 10:00)  HR: 78 (23 May 2021 06:10) (78 - 100)  BP: 100/54 (23 May 2021 06:10) (94/55 - 111/65)  BP(mean): --  RR: 18 (23 May 2021 06:10) (17 - 19)  SpO2: 100% (23 May 2021 06:10) (98% - 100%)    Gen: NAD  Breast: Soft, nontender, not engorged.  Abdomen: Soft, nontender, no distension , firm uterine fundus at umbilicus.  Incision: C/D/I.  Pelvic: Normal lochia noted  Ext: No calf tenderness    LABS:                        9.4    23.97 )-----------( 238      ( 22 May 2021 07:15 )             29.6     MEDICATIONS  (STANDING):  acetaminophen   Tablet .. 975 milliGRAM(s) Oral <User Schedule>  diphtheria/tetanus/pertussis (acellular) Vaccine (ADAcel) 0.5 milliLiter(s) IntraMuscular once  ertapenem  IVPB      ertapenem  IVPB 1000 milliGRAM(s) IV Intermittent every 24 hours  ferrous    sulfate 325 milliGRAM(s) Oral three times a day  heparin   Injectable 5000 Unit(s) SubCutaneous every 12 hours  ibuprofen  Tablet. 600 milliGRAM(s) Oral every 6 hours  prenatal multivitamin 1 Tablet(s) Oral daily  senna 1 Tablet(s) Oral two times a day    MEDICATIONS  (PRN):  diphenhydrAMINE 25 milliGRAM(s) Oral every 6 hours PRN Pruritus  lanolin Ointment 1 Application(s) Topical every 6 hours PRN Sore Nipples  magnesium hydroxide Suspension 30 milliLiter(s) Oral two times a day PRN Constipation  oxyCODONE    IR 5 milliGRAM(s) Oral every 3 hours PRN Moderate to Severe Pain (4-10)  oxyCODONE    IR 5 milliGRAM(s) Oral once PRN Moderate to Severe Pain (4-10)  simethicone 80 milliGRAM(s) Chew every 4 hours PRN Gas        Assessment and Plan  POD # 2 s/p C/S.  Doing well.  Encourage ambulation.  Ensure adequate pain control, encourage use of abdominal binder  Incisional care and PO instructions reviewed.  [facility].  informed consent obtained for circumcision for baby boy, all questions answered  patient stable for discharge home today, follow up in the office in 2 weeks for incision check/PP visit       Post-Operative Note, C/S  She is a  26y woman who is now post-operative day: 2 s/p PCS for Cat 2/arrest.  ml. Intrapartum fever, patient has now been afebrile > 24 hrs. S/p Ertapenem. She denies chills, muscle aches, fevers. Rh negative, s/p Rhogam 5/22. The patient feels well. reports mild incisional pain s/p Motrin overnight, requesting tylenol now, RN aware. She is ambulating.  She is tolerating regular diet. She denies nausea and vomiting; denies fever. She is voiding. She reports normal postpartum bleeding. She is breastfeeding. She is formula feeding. Desires circ for baby boy.     Physical exam:    Vital Signs Last 24 Hrs  T(C): 36.5 (23 May 2021 06:10), Max: 36.9 (22 May 2021 10:00)  T(F): 97.7 (23 May 2021 06:10), Max: 98.4 (22 May 2021 10:00)  HR: 78 (23 May 2021 06:10) (78 - 100)  BP: 100/54 (23 May 2021 06:10) (94/55 - 111/65)  BP(mean): --  RR: 18 (23 May 2021 06:10) (17 - 19)  SpO2: 100% (23 May 2021 06:10) (98% - 100%)    Gen: NAD  Breast: Soft, nontender, not engorged.  Abdomen: Soft, nontender, no distension , firm uterine fundus at umbilicus.  Incision: C/D/I.  Pelvic: Normal lochia noted  Ext: No calf tenderness    LABS:                        9.4    23.97 )-----------( 238      ( 22 May 2021 07:15 )             29.6     MEDICATIONS  (STANDING):  acetaminophen   Tablet .. 975 milliGRAM(s) Oral <User Schedule>  diphtheria/tetanus/pertussis (acellular) Vaccine (ADAcel) 0.5 milliLiter(s) IntraMuscular once  ertapenem  IVPB      ertapenem  IVPB 1000 milliGRAM(s) IV Intermittent every 24 hours  ferrous    sulfate 325 milliGRAM(s) Oral three times a day  heparin   Injectable 5000 Unit(s) SubCutaneous every 12 hours  ibuprofen  Tablet. 600 milliGRAM(s) Oral every 6 hours  prenatal multivitamin 1 Tablet(s) Oral daily  senna 1 Tablet(s) Oral two times a day    MEDICATIONS  (PRN):  diphenhydrAMINE 25 milliGRAM(s) Oral every 6 hours PRN Pruritus  lanolin Ointment 1 Application(s) Topical every 6 hours PRN Sore Nipples  magnesium hydroxide Suspension 30 milliLiter(s) Oral two times a day PRN Constipation  oxyCODONE    IR 5 milliGRAM(s) Oral every 3 hours PRN Moderate to Severe Pain (4-10)  oxyCODONE    IR 5 milliGRAM(s) Oral once PRN Moderate to Severe Pain (4-10)  simethicone 80 milliGRAM(s) Chew every 4 hours PRN Gas        Assessment and Plan  POD # 2 s/p C/S.  Doing well.  Encourage ambulation.  Ensure adequate pain control, encourage use of abdominal binder  Incisional care and PO instructions reviewed.  [facility].  informed consent obtained for circumcision for baby boy, all questions answered  patient stable for discharge home today, follow up in the office in 2 weeks for incision check/PP visit

## 2021-08-02 NOTE — OB PROVIDER TRIAGE NOTE - NSNYCREQUIREMENTS_OBGYN_ALL_OB
KATHIE Preparation Of Recipient Site - Flap: The eschar was removed surgically with sharp dissection to facilitate appropriate wound healing of the following adjacent tissue rearrangement.

## 2022-07-01 NOTE — PACU DISCHARGE NOTE - PAIN:
Controlled with current regime
Medical Necessity Information: It is in your best interest to select a reason for this procedure from the list below. All of these items fulfill various CMS LCD requirements except the new and changing color options.
Medical Necessity Clause: This procedure was medically necessary because the lesion that was treated was:
Lab: -97
Lab Facility: [id number]
Detail Level: Detailed
Was A Bandage Applied: Yes
Size Of Lesion In Cm (Required): 1.7
Biopsy Method: Dermablade
Anesthesia Type: 1% lidocaine with epinephrine and a 1:10 solution of 8.4% sodium bicarbonate
Hemostasis: Drysol
Wound Care: Petrolatum
Render Path Notes In Note?: No
Consent was obtained from the patient. The risks and benefits to therapy were discussed in detail. Specifically, the risks of infection, scarring, bleeding, prolonged wound healing, incomplete removal, allergy to anesthesia, nerve injury and recurrence were addressed. Prior to the procedure, the treatment site was clearly identified and confirmed by the patient. All components of Universal Protocol/PAUSE Rule completed.
Post-Care Instructions: I reviewed with the patient in detail post-care instructions. Patient is to keep the biopsy site dry overnight, and then apply bacitracin twice daily until healed. Patient may apply hydrogen peroxide soaks to remove any crusting.
Notification Instructions: Patient will be notified of pathology results. However, patient instructed to call the office if not contacted within 2 weeks.
Billing Type: Third-Party Bill

## 2022-10-12 PROBLEM — B00.9 HERPESVIRAL INFECTION, UNSPECIFIED: Chronic | Status: ACTIVE | Noted: 2021-05-20

## 2022-10-12 PROBLEM — Z33.2 ENCOUNTER FOR ELECTIVE TERMINATION OF PREGNANCY: Chronic | Status: ACTIVE | Noted: 2021-05-20

## 2022-11-01 ENCOUNTER — APPOINTMENT (OUTPATIENT)
Dept: ANTEPARTUM | Facility: CLINIC | Age: 27
End: 2022-11-01

## 2022-11-01 ENCOUNTER — ASOB RESULT (OUTPATIENT)
Age: 27
End: 2022-11-01

## 2022-11-01 PROCEDURE — 76801 OB US < 14 WKS SINGLE FETUS: CPT | Mod: 59

## 2022-11-01 PROCEDURE — 76813 OB US NUCHAL MEAS 1 GEST: CPT

## 2022-12-23 ENCOUNTER — APPOINTMENT (OUTPATIENT)
Dept: ANTEPARTUM | Facility: CLINIC | Age: 27
End: 2022-12-23

## 2022-12-23 ENCOUNTER — ASOB RESULT (OUTPATIENT)
Age: 27
End: 2022-12-23

## 2022-12-23 PROCEDURE — 76811 OB US DETAILED SNGL FETUS: CPT

## 2022-12-29 ENCOUNTER — APPOINTMENT (OUTPATIENT)
Dept: ENDOCRINOLOGY | Facility: CLINIC | Age: 27
End: 2022-12-29

## 2023-01-22 ENCOUNTER — OUTPATIENT (OUTPATIENT)
Dept: INPATIENT UNIT | Facility: HOSPITAL | Age: 28
LOS: 1 days | Discharge: HOME | End: 2023-01-22
Payer: MEDICAID

## 2023-01-22 ENCOUNTER — RESULT REVIEW (OUTPATIENT)
Age: 28
End: 2023-01-22

## 2023-01-22 ENCOUNTER — EMERGENCY (EMERGENCY)
Facility: HOSPITAL | Age: 28
LOS: 1 days | Discharge: STILL ADMITTED OR ADMINISTRATIVE | End: 2023-01-22
Admitting: EMERGENCY MEDICINE
Payer: MEDICAID

## 2023-01-22 ENCOUNTER — APPOINTMENT (OUTPATIENT)
Dept: ANTEPARTUM | Facility: CLINIC | Age: 28
End: 2023-01-22

## 2023-01-22 VITALS
DIASTOLIC BLOOD PRESSURE: 77 MMHG | TEMPERATURE: 98 F | OXYGEN SATURATION: 100 % | RESPIRATION RATE: 16 BRPM | HEART RATE: 69 BPM | SYSTOLIC BLOOD PRESSURE: 119 MMHG

## 2023-01-22 VITALS
HEART RATE: 74 BPM | SYSTOLIC BLOOD PRESSURE: 113 MMHG | TEMPERATURE: 98 F | RESPIRATION RATE: 18 BRPM | DIASTOLIC BLOOD PRESSURE: 69 MMHG

## 2023-01-22 VITALS — SYSTOLIC BLOOD PRESSURE: 120 MMHG | HEART RATE: 80 BPM | DIASTOLIC BLOOD PRESSURE: 62 MMHG

## 2023-01-22 DIAGNOSIS — O26.899 OTHER SPECIFIED PREGNANCY RELATED CONDITIONS, UNSPECIFIED TRIMESTER: ICD-10-CM

## 2023-01-22 LAB
ALBUMIN SERPL ELPH-MCNC: 3.9 G/DL — SIGNIFICANT CHANGE UP (ref 3.3–5)
ALP SERPL-CCNC: 90 U/L — SIGNIFICANT CHANGE UP (ref 40–120)
ALT FLD-CCNC: 10 U/L — SIGNIFICANT CHANGE UP (ref 4–33)
AMYLASE P1 CFR SERPL: 80 U/L — SIGNIFICANT CHANGE UP (ref 25–125)
ANION GAP SERPL CALC-SCNC: 13 MMOL/L — SIGNIFICANT CHANGE UP (ref 7–14)
APPEARANCE UR: ABNORMAL
APTT BLD: 27.3 SEC — SIGNIFICANT CHANGE UP (ref 27–36.3)
AST SERPL-CCNC: 16 U/L — SIGNIFICANT CHANGE UP (ref 4–32)
BACTERIA # UR AUTO: NEGATIVE — SIGNIFICANT CHANGE UP
BILIRUB SERPL-MCNC: <0.2 MG/DL — SIGNIFICANT CHANGE UP (ref 0.2–1.2)
BILIRUB UR-MCNC: NEGATIVE — SIGNIFICANT CHANGE UP
BUN SERPL-MCNC: 9 MG/DL — SIGNIFICANT CHANGE UP (ref 7–23)
CALCIUM SERPL-MCNC: 9 MG/DL — SIGNIFICANT CHANGE UP (ref 8.4–10.5)
CHLORIDE SERPL-SCNC: 101 MMOL/L — SIGNIFICANT CHANGE UP (ref 98–107)
CO2 SERPL-SCNC: 24 MMOL/L — SIGNIFICANT CHANGE UP (ref 22–31)
COLOR SPEC: SIGNIFICANT CHANGE UP
CREAT ?TM UR-MCNC: 85 MG/DL — SIGNIFICANT CHANGE UP
CREAT SERPL-MCNC: 0.56 MG/DL — SIGNIFICANT CHANGE UP (ref 0.5–1.3)
DIFF PNL FLD: NEGATIVE — SIGNIFICANT CHANGE UP
EGFR: 128 ML/MIN/1.73M2 — SIGNIFICANT CHANGE UP
EPI CELLS # UR: 3 /HPF — SIGNIFICANT CHANGE UP (ref 0–5)
FIBRINOGEN PPP-MCNC: 465 MG/DL — SIGNIFICANT CHANGE UP (ref 200–465)
FLUAV AG NPH QL: SIGNIFICANT CHANGE UP
FLUBV AG NPH QL: SIGNIFICANT CHANGE UP
GLUCOSE SERPL-MCNC: 134 MG/DL — HIGH (ref 70–99)
GLUCOSE UR QL: NEGATIVE — SIGNIFICANT CHANGE UP
HCT VFR BLD CALC: 36.3 % — SIGNIFICANT CHANGE UP (ref 34.5–45)
HGB BLD-MCNC: 11.8 G/DL — SIGNIFICANT CHANGE UP (ref 11.5–15.5)
HYALINE CASTS # UR AUTO: 1 /LPF — SIGNIFICANT CHANGE UP (ref 0–7)
INR BLD: 0.96 RATIO — SIGNIFICANT CHANGE UP (ref 0.88–1.16)
KETONES UR-MCNC: ABNORMAL
LDH SERPL L TO P-CCNC: 149 U/L — SIGNIFICANT CHANGE UP (ref 135–225)
LEUKOCYTE ESTERASE UR-ACNC: NEGATIVE — SIGNIFICANT CHANGE UP
LIDOCAIN IGE QN: 43 U/L — SIGNIFICANT CHANGE UP (ref 7–60)
MCHC RBC-ENTMCNC: 28.4 PG — SIGNIFICANT CHANGE UP (ref 27–34)
MCHC RBC-ENTMCNC: 32.5 GM/DL — SIGNIFICANT CHANGE UP (ref 32–36)
MCV RBC AUTO: 87.3 FL — SIGNIFICANT CHANGE UP (ref 80–100)
NITRITE UR-MCNC: NEGATIVE — SIGNIFICANT CHANGE UP
NRBC # BLD: 0 /100 WBCS — SIGNIFICANT CHANGE UP (ref 0–0)
NRBC # FLD: 0 K/UL — SIGNIFICANT CHANGE UP (ref 0–0)
PH UR: 7.5 — SIGNIFICANT CHANGE UP (ref 5–8)
PLATELET # BLD AUTO: 224 K/UL — SIGNIFICANT CHANGE UP (ref 150–400)
POTASSIUM SERPL-MCNC: 3.7 MMOL/L — SIGNIFICANT CHANGE UP (ref 3.5–5.3)
POTASSIUM SERPL-SCNC: 3.7 MMOL/L — SIGNIFICANT CHANGE UP (ref 3.5–5.3)
PROT ?TM UR-MCNC: 9 MG/DL — SIGNIFICANT CHANGE UP
PROT SERPL-MCNC: 7 G/DL — SIGNIFICANT CHANGE UP (ref 6–8.3)
PROT UR-MCNC: ABNORMAL
PROT/CREAT UR-RTO: 0.1 RATIO — SIGNIFICANT CHANGE UP (ref 0–0.2)
PROTHROM AB SERPL-ACNC: 11.1 SEC — SIGNIFICANT CHANGE UP (ref 10.5–13.4)
RBC # BLD: 4.16 M/UL — SIGNIFICANT CHANGE UP (ref 3.8–5.2)
RBC # FLD: 13 % — SIGNIFICANT CHANGE UP (ref 10.3–14.5)
RBC CASTS # UR COMP ASSIST: 2 /HPF — SIGNIFICANT CHANGE UP (ref 0–4)
RSV RNA NPH QL NAA+NON-PROBE: SIGNIFICANT CHANGE UP
SARS-COV-2 RNA SPEC QL NAA+PROBE: SIGNIFICANT CHANGE UP
SODIUM SERPL-SCNC: 138 MMOL/L — SIGNIFICANT CHANGE UP (ref 135–145)
SP GR SPEC: 1.03 — SIGNIFICANT CHANGE UP (ref 1.01–1.05)
URATE SERPL-MCNC: 3.4 MG/DL — SIGNIFICANT CHANGE UP (ref 2.5–7)
UROBILINOGEN FLD QL: SIGNIFICANT CHANGE UP
WBC # BLD: 11.72 K/UL — HIGH (ref 3.8–10.5)
WBC # FLD AUTO: 11.72 K/UL — HIGH (ref 3.8–10.5)
WBC UR QL: 1 /HPF — SIGNIFICANT CHANGE UP (ref 0–5)

## 2023-01-22 PROCEDURE — 99214 OFFICE O/P EST MOD 30 MIN: CPT | Mod: 25

## 2023-01-22 PROCEDURE — L9996: CPT

## 2023-01-22 PROCEDURE — 59025 FETAL NON-STRESS TEST: CPT | Mod: 26

## 2023-01-22 PROCEDURE — 76815 OB US LIMITED FETUS(S): CPT | Mod: 26

## 2023-01-22 PROCEDURE — 76770 US EXAM ABDO BACK WALL COMP: CPT | Mod: 26,59

## 2023-01-22 PROCEDURE — 76705 ECHO EXAM OF ABDOMEN: CPT | Mod: 26

## 2023-01-22 RX ORDER — FAMOTIDINE 10 MG/ML
20 INJECTION INTRAVENOUS ONCE
Refills: 0 | Status: COMPLETED | OUTPATIENT
Start: 2023-01-22 | End: 2023-01-22

## 2023-01-22 RX ORDER — CITRIC ACID/SODIUM CITRATE 300-500 MG
30 SOLUTION, ORAL ORAL ONCE
Refills: 0 | Status: COMPLETED | OUTPATIENT
Start: 2023-01-22 | End: 2023-01-22

## 2023-01-22 RX ORDER — ACETAMINOPHEN 500 MG
1000 TABLET ORAL ONCE
Refills: 0 | Status: COMPLETED | OUTPATIENT
Start: 2023-01-22 | End: 2023-01-22

## 2023-01-22 RX ORDER — SODIUM CHLORIDE 9 MG/ML
1000 INJECTION, SOLUTION INTRAVENOUS ONCE
Refills: 0 | Status: COMPLETED | OUTPATIENT
Start: 2023-01-22 | End: 2023-01-22

## 2023-01-22 RX ORDER — METOCLOPRAMIDE HCL 10 MG
10 TABLET ORAL ONCE
Refills: 0 | Status: COMPLETED | OUTPATIENT
Start: 2023-01-22 | End: 2023-01-22

## 2023-01-22 RX ORDER — SODIUM CHLORIDE 9 MG/ML
1000 INJECTION, SOLUTION INTRAVENOUS
Refills: 0 | Status: DISCONTINUED | OUTPATIENT
Start: 2023-01-22 | End: 2023-02-05

## 2023-01-22 RX ADMIN — Medication 1000 MILLIGRAM(S): at 06:51

## 2023-01-22 RX ADMIN — SODIUM CHLORIDE 3000 MILLILITER(S): 9 INJECTION, SOLUTION INTRAVENOUS at 07:32

## 2023-01-22 RX ADMIN — FAMOTIDINE 20 MILLIGRAM(S): 10 INJECTION INTRAVENOUS at 06:07

## 2023-01-22 RX ADMIN — Medication 10 MILLIGRAM(S): at 06:06

## 2023-01-22 RX ADMIN — Medication 400 MILLIGRAM(S): at 06:07

## 2023-01-22 RX ADMIN — Medication 30 MILLILITER(S): at 13:43

## 2023-01-22 RX ADMIN — SODIUM CHLORIDE 1000 MILLILITER(S): 9 INJECTION, SOLUTION INTRAVENOUS at 06:08

## 2023-01-22 NOTE — OB PROVIDER TRIAGE NOTE - NSHPPHYSICALEXAM_GEN_ALL_CORE
T(C): 36.8 (01-22-23 @ 04:18), Max: 36.8 (01-22-23 @ 04:18)  HR: 74 (01-22-23 @ 05:21) (69 - 74)  BP: 113/69 (01-22-23 @ 05:21) (113/69 - 119/77)  RR: 16 (01-22-23 @ 04:18) (16 - 16)  SpO2: 100% (01-22-23 @ 04:18) (100% - 100%)    Heart: RRR  Lungs: CTA  Abdomen: Gravid, soft, NT, no CVA tenderness    NST: Reactive with moderate variability, no decels  Waucoma: No contractions seen

## 2023-01-22 NOTE — OB PROVIDER TRIAGE NOTE - NSOBPROVIDERNOTE_OBGYN_ALL_OB_FT
27y  LMP 2012 at 24w2d presents Rt sided upper back pain with nausea, vomiting  D/w Dr Bone  PEC labs, amylase, lipase  IV bolus  Reglan and pepcid given  Bedside abdominal sono done 27y  LMP 2012 at 24w2d presents Rt sided upper back pain with nausea, vomiting  D/w Dr Bone  PEC labs, amylase, lipase  IV bolus  Reglan and pepcid given  Bedside abdominal sono done    0700  Received report from TAMMY Wyatt. awaiting UA specimen  RL 1L IV bolus x 1 27y  LMP 2012 at 24w2d presents Rt sided upper back pain with nausea, vomiting  D/w Dr Bone  PEC labs, amylase, lipase  IV bolus  Reglan and pepcid given  Bedside abdominal sono done    0700  Received report from TAMMY Wyatt. awaiting UA specimen  RL 1L IV bolus x 1    0800  pt provided urine specimen and stating return of nausea and back pain 27y  LMP 2012 at 24w2d presents Rt sided upper back pain with nausea, vomiting  D/w Dr Bone  PEC labs, amylase, lipase  IV bolus  Reglan and pepcid given  Bedside abdominal sono done    0700  Received report from TAMMY Wyatt. awaiting UA specimen  RL 1L IV bolus x 1    0800  pt provided urine specimen and stating return of nausea and back pain  renal and bladder ultrasound ordered    0930  discussed patient at board rounds plan for rapid respiratory panel   awaiting renal and bladder ultrasound    1000  rapid respiratory panel sent  pt c/o 10/10 constant right flank pain  resting on left side    Suleiman, NP 27y  LMP 2012 at 24w2d presents Rt sided upper back pain with nausea, vomiting  D/w Dr Bone  PEC labs, amylase, lipase  IV bolus  Reglan and pepcid given  Bedside abdominal sono done    0700  Received report from TAMMY Wyatt. awaiting UA specimen  RL 1L IV bolus x 1    0800  pt provided urine specimen and stating return of nausea and back pain  renal and bladder ultrasound ordered    0930  discussed patient at board rounds plan for rapid respiratory panel   awaiting renal and bladder ultrasound    1000  rapid respiratory panel sent  pt c/o 10/10 constant right flank pain  resting on left side  pt stating normal BM this AM, denies constipation  RL @ 250 mL /hr    Suleiman, NP 27y  LMP 2012 at 24w2d presents Rt sided upper back pain with nausea, vomiting  D/w Dr Bone  PEC labs, amylase, lipase  IV bolus  Reglan and pepcid given  Bedside abdominal sono done    0700  Received report from TAMMY Wyatt. awaiting UA specimen  RL 1L IV bolus x 1    0800  pt provided urine specimen and stating return of nausea and back pain  renal and bladder ultrasound ordered    0930  discussed patient at board rounds plan for rapid respiratory panel   awaiting renal and bladder ultrasound    1000  rapid respiratory panel sent  pt c/o 10/10 constant right flank pain  resting on left side  pt stating normal BM this AM, denies constipation  RL @ 250 mL /hr    1035a  renal and bladder ultrasound WNL  plan for RUQ ultrasound r/o cholelithiasis    Suleiman, NP 27y  LMP 2012 at 24w2d presents Rt sided upper back pain with nausea, vomiting  D/w Dr Bone  PEC labs, amylase, lipase  IV bolus  Reglan and pepcid given  Bedside abdominal sono done    0700  Received report from TAMMY Wyatt. awaiting UA specimen  RL 1L IV bolus x 1    0800  pt provided urine specimen and stating return of nausea and back pain  renal and bladder ultrasound ordered    0930  discussed patient at board rounds plan for rapid respiratory panel   awaiting renal and bladder ultrasound    1000  rapid respiratory panel sent  pt c/o 10/10 constant right flank pain  resting on left side  pt stating normal BM this AM, denies constipation  RL @ 250 mL /hr    1035a  renal and bladder ultrasound WNL  plan for RUQ ultrasound r/o cholelithiasis    1300  RUQ ultrasound report reviewed:    Cholelithiasis and gallbladder sludge. Negative sonographic Odom's sign. No wall thickening orpericholecystic fluid seen.  No right hydronephrosis. No visualized renal stone.    Discussed with Dr Monet. Plan for bicitra 30 mL PO x 1 and PO trial. If passed PO trial, plan for discharge home.    Plan reviewed with patient.     Suleiman, NP 27y  LMP 2012 at 24w2d presents Rt sided upper back pain with nausea, vomiting  D/w Dr Bone  PEC labs, amylase, lipase  IV bolus  Reglan and pepcid given  Bedside abdominal sono done    0700  Received report from TAMMY Wyatt. awaiting UA specimen  RL 1L IV bolus x 1    0800  pt provided urine specimen and stating return of nausea and back pain  renal and bladder ultrasound ordered    0930  discussed patient at board rounds plan for rapid respiratory panel   awaiting renal and bladder ultrasound    1000  rapid respiratory panel sent  pt c/o 10/10 constant right flank pain  resting on left side  pt stating normal BM this AM, denies constipation  RL @ 250 mL /hr    1035a  renal and bladder ultrasound WNL  plan for RUQ ultrasound r/o cholelithiasis    1300  RUQ ultrasound report reviewed:    Cholelithiasis and gallbladder sludge. Negative sonographic Odom's sign. No wall thickening or pericholecystic fluid seen.  No right hydronephrosis. No visualized renal stone.    Discussed with Dr Monet. Plan for bicitra 30 mL PO x 1 and PO trial. If passed PO trial, plan for discharge home.    Plan reviewed with patient.     1422  pt states relief from pain.  pt tolerated PO trial. No evidence of acute process.     pts history, medications, lab results, environmental factors reviewed.    Plan for discharge home.  labor precautions/fetal kick counts reviewed. Encouraged increased PO hydration. F/U with prenatal appointment in OB office in 1 week.    TOMI Bearden

## 2023-01-22 NOTE — OB RN TRIAGE NOTE - CHIEF COMPLAINT QUOTE
"Started nausea, and vomiting with back pain on the right side mostly from 1 am, I took one Tylenol at 1.30 am but didn't help"

## 2023-01-22 NOTE — OB PROVIDER TRIAGE NOTE - NSHPLABSRESULTS_GEN_ALL_CORE
Vital Signs Last 24 Hrs  T(C): 36.7 (23 @ 05:10), Max: 36.8 (23 @ 04:18)  T(F): 98.06 (23 @ 05:10), Max: 98.2 (23 @ 04:18)  HR: 72 (23 @ 08:15) (69 - 74)  BP: 108/59 (23 @ 08:15) (108/59 - 119/77)  BP(mean): --  RR: 18 (23 @ 05:05) (16 - 18)  SpO2: 100% (23 @ 04:18) (100% - 100%)               CBC Full  -  ( 2023 05:47 )  WBC Count : 11.72 K/uL  RBC Count : 4.16 M/uL  Hemoglobin : 11.8 g/dL  Hematocrit : 36.3 %  Platelet Count - Automated : 224 K/uL  Mean Cell Volume : 87.3 fL  Mean Cell Hemoglobin : 28.4 pg  Mean Cell Hemoglobin Concentration : 32.5 gm/dL  Auto Neutrophil # : x  Auto Lymphocyte # : x  Auto Monocyte # : x  Auto Eosinophil # : x  Auto Basophil # : x  Auto Neutrophil % : x  Auto Lymphocyte % : x  Auto Monocyte % : x  Auto Eosinophil % : x  Auto Basophil % : x        138  |  101  |  9   ----------------------------<  134<H>  3.7   |  24  |  0.56    Ca    9.0      2023 05:47    TPro  7.0  /  Alb  3.9  /  TBili  <0.2  /  DBili  x   /  AST  16  /  ALT  10  /  AlkPhos  90      PT/INR - ( 2023 05:47 )   PT: 11.1 sec;   INR: 0.96 ratio         PTT - ( 2023 05:47 )  PTT:27.3 sec    Urinalysis Basic - ( 2023 08:00 )    Color: Light Yellow / Appearance: Slightly Turbid / S.027 / pH: x  Gluc: x / Ketone: Small  / Bili: Negative / Urobili: <2 mg/dL   Blood: x / Protein: Trace / Nitrite: Negative   Leuk Esterase: Negative / RBC: 2 /HPF / WBC 1 /HPF   Sq Epi: x / Non Sq Epi: 3 /HPF / Bacteria: Negative

## 2023-01-22 NOTE — OB RN TRIAGE NOTE - NS_OBGYNHISTORY_OBGYN_ALL_OB_FT
5/21/2021 C/S for Arrest of dilatation , failed IOL for Oligohydramnios, maternal fever FT 7#8  SABX1 with no D&C  H/O HSV last outbreak in 2020

## 2023-01-22 NOTE — OB RN TRIAGE NOTE - NSICDXPASTMEDICALHX_GEN_ALL_CORE_FT
PAST MEDICAL HISTORY:  Herpes simplex virus (HSV) infection on Valtrex last outbreak Oct. 2020    Hypothyroidism     Termination of pregnancy (fetus) 2016    UTI (urinary tract infection) tx in past

## 2023-01-22 NOTE — OB RN TRIAGE NOTE - NS_ACCOMPAINEDBY_OBGYN_ALL_OB
Nutrition Assessment   Assessment Type: Initial assessment  Reason for Visit: Registered Dietitian Evaluation  Referral Requested By: Other: screening  Chart Medications Lab Results Reviewed: yes    Nutritional Risk Factors: Poor PO prior to admission and High risk diagnosis    Current Diet Order: NPO  Diet Tolerance: na  Food Allergies: no  Priority Points: Status 2    Demographic/Anthropometrics Information  Gender: female   Patient Age: 22 year old  Height: Height: 5' 3\" (160 cm)  Weight: Weight: 62.5 kg  BMI: Body mass index is 24.41 kg/m².    Usual Body Weight: 63 kg  % Weight change: na   Reason for Weight Change: Other:na    Physical Appearance: Appropriate for age  Weight Classification: Normal weight (BMI 18.5-24.9)    Estimated Nutritional Needs  Assessment Weight: 63  kg  Energy Needs: 30- kcal/kg  (1900 kcal/day)  Protein Needs: 1 g/kg      (63 grams/day)  Fluid Needs: Fluids per MD     Nutrition Diagnosis (PES)  Inadequate oral intake related to Inability to tolerate as evidenced by Need for NPO status    Nutrition Plan  Recommended Nutrition Intervention: Coordination of nutrition care by a nutrition professional  Monitor: Food and beverage intake    Discharge Needs: Pending  Care Plan Discussed With: Patient  Goals: Meet >/= 75% of estimated needs  Goal Progress: Initiated  Timeframe to Achieve Goal: Ongoing    Dietitian Notes/Impressions/Recommendations:  10/21/20: Pt has had poor po 7-8 days pta, + vomiting for possibly 5 days. No BM x 1 week. Pt has had some choking episodes, difficulty managing secretions. Passed SLP eval this am, will have pureed with thin liquids with straw placed in back of mouth and pt will have to use fingers to close lips around straw. Her lips are swollen and her cheeks are weak. Pt agreeable to supplements, she is quite scared so kept interview to a minimum.   Weight Hx: na   NFPE: na      TREATMENT PLAN: Monitoring & Interventions   1. Pureed diet.  2. Start van and straw  Ensure Enlive TID with meals.  3. Monitor ability to meet needs.       Malnutrition Status: na            Self

## 2023-01-22 NOTE — OB PROVIDER TRIAGE NOTE - HISTORY OF PRESENT ILLNESS
27y  LMP 2012 presents to triage c/o sudden onset of right sided upper back pain radiating to upper abdomen which started about 1am today. Pt also c/o nausea, vomiting x 3. Denies any headache, no visual changes.   Denies any fever or chills. Denies any abdominal cramping or contractions. Denies any urinary symptoms.  Reports +FM, no vaginal bleeding, no ROM or LOF  Prenatal care: Dr Packer. Hypothyroidism diagnosed at ~10wks.    GYN: denies fibroids, ovarian Cyst, or abnormal pap smear           +h/o HSV, last outbreak 10/2020  OBH: 21 FT C/s FTD at 5cm 7#8oz           TOP x 1   PAST MEDICAL   Hypothyroidism diagnosed at 10wks  PAST SURGICAL HISTORY:   C/section x 1  NKDA   27y  LMP 2012 at 24w2d presents to triage c/o sudden onset of right sided upper back pain radiating to upper abdomen which started about 1am today. Pt also c/o nausea, vomiting x 3. Denies any headache, no visual changes.   Denies any fever or chills. Denies any abdominal cramping or contractions. Denies any urinary symptoms.  Reports +FM, no vaginal bleeding, no ROM or LOF  Prenatal care: Dr Packer. Hypothyroidism diagnosed at ~10wks.    GYN: denies fibroids, ovarian Cyst, or abnormal pap smear           +h/o HSV, last outbreak 10/2020  OBH: 21 FT C/s FTD at 5cm 7#8oz           TOP x 1   PAST MEDICAL   Hypothyroidism diagnosed at 10wks  PAST SURGICAL HISTORY:   C/section x 1  NKDA

## 2023-01-22 NOTE — ED ADULT TRIAGE NOTE - CHIEF COMPLAINT QUOTE
Pt c/o upper back pain, abd discomfort, nausea, vomiting since 1 am. pt is 24 weeks pregnant due 5/12. Pt denies any vaginal bleeding or discharge. No complaints of chest pain, headache, dizziness, SOB, fever, chills verbalized.

## 2023-01-22 NOTE — OB PROVIDER TRIAGE NOTE - ADDITIONAL INSTRUCTIONS
Please drink 1-2 liters of fluid per day. Please make an appointment to be seen in your OB office this week.

## 2023-01-23 ENCOUNTER — TRANSCRIPTION ENCOUNTER (OUTPATIENT)
Age: 28
End: 2023-01-23

## 2023-01-25 DIAGNOSIS — Z87.59 PERSONAL HISTORY OF OTHER COMPLICATIONS OF PREGNANCY, CHILDBIRTH AND THE PUERPERIUM: ICD-10-CM

## 2023-01-25 DIAGNOSIS — O26.612 LIVER AND BILIARY TRACT DISORDERS IN PREGNANCY, SECOND TRIMESTER: ICD-10-CM

## 2023-01-25 DIAGNOSIS — Z3A.24 24 WEEKS GESTATION OF PREGNANCY: ICD-10-CM

## 2023-01-25 DIAGNOSIS — O21.2 LATE VOMITING OF PREGNANCY: ICD-10-CM

## 2023-01-25 DIAGNOSIS — Z20.822 CONTACT WITH AND (SUSPECTED) EXPOSURE TO COVID-19: ICD-10-CM

## 2023-01-25 DIAGNOSIS — E03.9 HYPOTHYROIDISM, UNSPECIFIED: ICD-10-CM

## 2023-01-25 DIAGNOSIS — K82.8 OTHER SPECIFIED DISEASES OF GALLBLADDER: ICD-10-CM

## 2023-01-25 DIAGNOSIS — O99.282 ENDOCRINE, NUTRITIONAL AND METABOLIC DISEASES COMPLICATING PREGNANCY, SECOND TRIMESTER: ICD-10-CM

## 2023-01-25 DIAGNOSIS — K80.20 CALCULUS OF GALLBLADDER WITHOUT CHOLECYSTITIS WITHOUT OBSTRUCTION: ICD-10-CM

## 2023-01-25 DIAGNOSIS — O34.219 MATERNAL CARE FOR UNSPECIFIED TYPE SCAR FROM PREVIOUS CESAREAN DELIVERY: ICD-10-CM

## 2023-02-15 ENCOUNTER — NON-APPOINTMENT (OUTPATIENT)
Age: 28
End: 2023-02-15

## 2023-04-01 NOTE — OB RN DELIVERY SUMMARY - NS_DELBLDTRANSFUS_OBGYN_ALL_OB
[facility] Emergency Department  [id number] [facility] [address] [facility] [address]  Phone: [phone]  Fax: [phone]      Attending Physician Attestation    I performed a history and physical examination of the patient and discussed management with the mid level provider. I reviewed the mid level provider's note and agree with the documented findings and plan of care. Any areas of disagreement are noted on the chart. I was personally present for the key portions of any procedures. I have documented in the chart those procedures where I was not present during the key portions. I have reviewed the emergency nurses triage note. I agree with the chief complaint, past medical history, past surgical history, allergies, medications, social and family history as documented unless otherwise noted below. Documentation of the HPI, Physical Exam and Medical Decision Making performed by mid level providers is based on my personal performance of the HPI, PE and MDM. For Physician Assistant/ Nurse Practitioner cases/documentation I have personally evaluated this patient and have completed at least one if not all key elements of the E/M (history, physical exam, and MDM). Additional findings are as noted. CHIEF COMPLAINT       Chief Complaint   Patient presents with    URI     Pt. States being here few days ago for bronchitis, was sent home with Abx and tessalon, nothing is working          [facility]    Yolande Blank is a 66 y.o. female who presents to the emergency department with continuing coughing despite her treatment for her bronchitis. PAST MEDICAL HISTORY    has a past medical history of Asthma, Cancer ([address] Diabetes mellitus ([address] Hernia of abdominal cavity, Immune deficiency disorder ([address] and Obesity. SURGICAL HISTORY      has a past surgical history that includes Liver transplant; Appendectomy; cyst removal; Tonsillectomy; Thyroid surgery;  Cholecystectomy; Mastectomy (Right); and Cataract removal.    CURRENT MEDICATIONS       Discharge Medication List as of 4/1/2023  7:20 PM        CONTINUE these medications which have NOT CHANGED    Details   budesonide-formoterol (SYMBICORT) 80-4.5 MCG/ACT AERO Inhale 2 puffs into the lungs 2 times daily Stop using after one week, Disp-10.2 g, R-3Normal      Dextromethorphan-guaiFENesin (MUCINEX DM MAXIMUM STRENGTH)  MG TB12 Take 1 tablet by mouth 2 times daily, Disp-28 tablet, R-0Normal      pentoxifylline (TRENTAL) 400 MG extended release tablet Take 400 mg by mouth 3 times daily (with meals)Historical Med      anastrozole (ARIMIDEX) 1 MG tablet Take 1 mg by mouth dailyHistorical Med      atorvastatin (LIPITOR) 20 MG tablet Take 20 mg by mouth dailyHistorical Med      Insulin Degludec 100 UNIT/ML SOLN Inject 3 mLs into the skinHistorical Med      Melatonin 5 MG CAPS Take 5 mg by mouthHistorical Med      ondansetron (ZOFRAN) 4 MG tablet Take 1 tablet by mouth every 8 hours as needed for Nausea, Disp-20 tablet, R-0Normal      Cholecalciferol (VITAMIN D3) 2000 units CAPS Take 1 capsule by mouth dailyHistorical Med      pantoprazole (PROTONIX) 40 MG tablet Take 40 mg by mouth dailyHistorical Med      empagliflozin (JARDIANCE) 25 MG tablet Take 25 mg by mouth dailyHistorical Med      SITagliptin (JANUVIA) 100 MG tablet Take 100 mg by mouth dailyHistorical Med      sertraline (ZOLOFT) 50 MG tablet Take 50 mg by mouth dailyHistorical Med      azithromycin (ZITHROMAX) 250 MG tablet Take 2 tablets (500 mg) on Day 1, followed by 1 tablet (250 mg) once daily on Days 2 through 5., Disp-1 packet, R-0Print      omeprazole (PRILOSEC) 40 MG capsule Take 40 mg by mouth dailyHistorical Med      BIOTIN 5000 PO Take by mouthHistorical Med      Loratadine (CLARITIN PO) Take by mouthHistorical Med      Respiratory Therapy Supplies (NEBULIZER COMPRESSOR) KIT ONCE Starting 3/26/2016, Disp-1 kit, R-0, Print      tacrolimus (PROGRAF) 0.5 MG capsule No

## 2023-05-04 PROBLEM — E03.9 HYPOTHYROIDISM, UNSPECIFIED: Chronic | Status: ACTIVE | Noted: 2023-01-22

## 2023-05-11 ENCOUNTER — OUTPATIENT (OUTPATIENT)
Dept: OUTPATIENT SERVICES | Facility: HOSPITAL | Age: 28
LOS: 1 days | End: 2023-05-11

## 2023-05-11 VITALS
WEIGHT: 210.1 LBS | DIASTOLIC BLOOD PRESSURE: 76 MMHG | RESPIRATION RATE: 20 BRPM | HEIGHT: 67 IN | TEMPERATURE: 96 F | SYSTOLIC BLOOD PRESSURE: 116 MMHG | OXYGEN SATURATION: 98 % | HEART RATE: 68 BPM

## 2023-05-11 DIAGNOSIS — O34.211 MATERNAL CARE FOR LOW TRANSVERSE SCAR FROM PREVIOUS CESAREAN DELIVERY: ICD-10-CM

## 2023-05-11 DIAGNOSIS — O13.9 GESTATIONAL [PREGNANCY-INDUCED] HYPERTENSION WITHOUT SIGNIFICANT PROTEINURIA, UNSPECIFIED TRIMESTER: ICD-10-CM

## 2023-05-11 DIAGNOSIS — Z98.891 HISTORY OF UTERINE SCAR FROM PREVIOUS SURGERY: Chronic | ICD-10-CM

## 2023-05-11 LAB
ALBUMIN SERPL ELPH-MCNC: 3.5 G/DL — SIGNIFICANT CHANGE UP (ref 3.3–5)
ALP SERPL-CCNC: 186 U/L — HIGH (ref 40–120)
ALT FLD-CCNC: 8 U/L — SIGNIFICANT CHANGE UP (ref 4–33)
ANION GAP SERPL CALC-SCNC: 12 MMOL/L — SIGNIFICANT CHANGE UP (ref 7–14)
APPEARANCE UR: ABNORMAL
AST SERPL-CCNC: 21 U/L — SIGNIFICANT CHANGE UP (ref 4–32)
BACTERIA # UR AUTO: ABNORMAL
BILIRUB SERPL-MCNC: <0.2 MG/DL — SIGNIFICANT CHANGE UP (ref 0.2–1.2)
BILIRUB UR-MCNC: NEGATIVE — SIGNIFICANT CHANGE UP
BLD GP AB SCN SERPL QL: NEGATIVE — SIGNIFICANT CHANGE UP
BUN SERPL-MCNC: 9 MG/DL — SIGNIFICANT CHANGE UP (ref 7–23)
CALCIUM SERPL-MCNC: 9.2 MG/DL — SIGNIFICANT CHANGE UP (ref 8.4–10.5)
CHLORIDE SERPL-SCNC: 105 MMOL/L — SIGNIFICANT CHANGE UP (ref 98–107)
CO2 SERPL-SCNC: 21 MMOL/L — LOW (ref 22–31)
COLOR SPEC: COLORLESS — SIGNIFICANT CHANGE UP
CREAT SERPL-MCNC: 0.69 MG/DL — SIGNIFICANT CHANGE UP (ref 0.5–1.3)
DIFF PNL FLD: NEGATIVE — SIGNIFICANT CHANGE UP
EGFR: 121 ML/MIN/1.73M2 — SIGNIFICANT CHANGE UP
EPI CELLS # UR: 5 /HPF — SIGNIFICANT CHANGE UP (ref 0–5)
GLUCOSE SERPL-MCNC: 87 MG/DL — SIGNIFICANT CHANGE UP (ref 70–99)
GLUCOSE UR QL: NEGATIVE — SIGNIFICANT CHANGE UP
HCT VFR BLD CALC: 36.6 % — SIGNIFICANT CHANGE UP (ref 34.5–45)
HGB BLD-MCNC: 11.5 G/DL — SIGNIFICANT CHANGE UP (ref 11.5–15.5)
HYALINE CASTS # UR AUTO: 4 /LPF — SIGNIFICANT CHANGE UP (ref 0–7)
KETONES UR-MCNC: NEGATIVE — SIGNIFICANT CHANGE UP
LEUKOCYTE ESTERASE UR-ACNC: ABNORMAL
MCHC RBC-ENTMCNC: 26.6 PG — LOW (ref 27–34)
MCHC RBC-ENTMCNC: 31.4 GM/DL — LOW (ref 32–36)
MCV RBC AUTO: 84.7 FL — SIGNIFICANT CHANGE UP (ref 80–100)
NITRITE UR-MCNC: NEGATIVE — SIGNIFICANT CHANGE UP
NRBC # BLD: 0 /100 WBCS — SIGNIFICANT CHANGE UP (ref 0–0)
NRBC # FLD: 0 K/UL — SIGNIFICANT CHANGE UP (ref 0–0)
PH UR: 7 — SIGNIFICANT CHANGE UP (ref 5–8)
PLATELET # BLD AUTO: 215 K/UL — SIGNIFICANT CHANGE UP (ref 150–400)
POTASSIUM SERPL-MCNC: 3.8 MMOL/L — SIGNIFICANT CHANGE UP (ref 3.5–5.3)
POTASSIUM SERPL-SCNC: 3.8 MMOL/L — SIGNIFICANT CHANGE UP (ref 3.5–5.3)
PROT SERPL-MCNC: 6.6 G/DL — SIGNIFICANT CHANGE UP (ref 6–8.3)
PROT UR-MCNC: NEGATIVE — SIGNIFICANT CHANGE UP
RBC # BLD: 4.32 M/UL — SIGNIFICANT CHANGE UP (ref 3.8–5.2)
RBC # FLD: 15 % — HIGH (ref 10.3–14.5)
RBC CASTS # UR COMP ASSIST: 1 /HPF — SIGNIFICANT CHANGE UP (ref 0–4)
RH IG SCN BLD-IMP: NEGATIVE — SIGNIFICANT CHANGE UP
SODIUM SERPL-SCNC: 138 MMOL/L — SIGNIFICANT CHANGE UP (ref 135–145)
SP GR SPEC: 1.01 — LOW (ref 1.01–1.05)
UROBILINOGEN FLD QL: SIGNIFICANT CHANGE UP
WBC # BLD: 8.33 K/UL — SIGNIFICANT CHANGE UP (ref 3.8–10.5)
WBC # FLD AUTO: 8.33 K/UL — SIGNIFICANT CHANGE UP (ref 3.8–10.5)
WBC UR QL: 16 /HPF — HIGH (ref 0–5)

## 2023-05-11 NOTE — OB PST NOTE - HEIGHT IN INCHES
BREASTFEEDING SUPPORT SERVICES NOTE    Time spent with mother/baby: 16 minutes    Breastfeeding Support Staff have met with the patient/family and the following services have been provided:  Introduced to breastfeeding services    Proper positioning at the breast/latch on - infant in side lying latched well with swallows noted.  How to know breast feeding is going well at home by keeping a feeding diary, provided  Importance of Skin to Skin contact  24 hour rooming-in encouraged  Discussed cluster feedings  Delay of bottles and pacifiers while infant learning at breast.  Encouraged to look at videos/online breast feeding information  Evaluated medications: none  Maternal history of breastfeeding 2 other babies for 10 months and a few weeks with her second. Now 12 and 7 years old.    Mom will call her insurance company regarding a breast pump.   Private prescription form given for mom to sign.    Mom states she feels breast feeding is going well at this time  Discussed first verses second day of life.  If infant latches well after delivery breast feeding success goes up to 80%.  Infants usually sleep a lot the first 24 hours.  The second day they become more disorganized and fussy in their new environment. Encouraged Skin to Skin and offer breast often.          Mom taught hand expression to assist with colostrum removal:  -in addition to breastfeeding in baby who is not feeding well    -Mom to sit up to assist with milk removal  -Massage over the breast with hands or warm towel  -Place fingers and inch from areola, in a \"C\"  -apply gentle, steady pressure, inward toward the breast  (press, compress, relax)  -alternate breasts frequently  Milk easily expressed    Mom/family was encouraged to keep a feeding diary on new breastfeeding baby for the first two weeks or until baby is back to birthweight.  Feeding log can be discontinued once health care provider is reassured that feedings are going well and that baby's  weight gain pattern is adequate.    Lactation will continue to follow.   7

## 2023-05-11 NOTE — OB PST NOTE - NSHPPHYSICALEXAM_GEN_ALL_CORE
Constitutional: well developed, well groomed, well nourished, no distress    Eyes: PERRL, EOMI, conjunctiva clear    Ears: normal    Mouth and Gums: normal, moist    Pharynx: no tenderness, discharge, or peritonsillar abscess    Tonsils: no redness, discharge, tenderness, or swelling    Neck: supple, no JVD, normal thyroid gland, no cervical or paraspinal tenderness    Breast: normal shape    Back: normal shape, ROM intact, strength intact, no vertebral tenderness    Respiratory: airway patent, breast sounds equal, good air movement, respiration non-labored, clear to auscultation bilaterally, no chest wall tenderness, no wheezes, rhonchi, or rales    Cardiovascular: regular rate and rhythm, no rubs, murmur, normal PMI,     Gastrointestinal: gravid abdomen, non tender, normal bowel sounds    Extremities: no clubbing, cyanosis, ++ 2 pedal edema    Vascular: carotid pulse normal, radial pulse normal, DP pulse normal, PT pulse normal    Neurological: alert and oriented x3, sensation intact, cranial nerves intact, normal strength    Skin: warm and dry, normal color    Lymph nodes: no anterior cervical lymphadenopathy    Musculoskeletal: ROM intact, normal strength, no joint swelling, warmth, or calf tenderness    Psychiatric: normal affect, normal behavior

## 2023-05-11 NOTE — OB PST NOTE - HISTORY OF PRESENT ILLNESS
28 year old pregnant female presents to presurgical testing scheduled for  Caesarean section. Patient denies vaginal  bleeding, spotting or leakage of amniotic fluid. Patient denies regular contractions. Patient reports positive fetal movement.

## 2023-05-11 NOTE — OB PST NOTE - PROBLEM SELECTOR PLAN 1
Patient tentatively scheduled for repeat      Pre-op instructions provided. Pt given verbal and written instructions with teach back on chlorhexidine shampoo and Pepcid. Pt verbalized understanding with return demonstration.    Patient to obtain all medication instructions as per OB.

## 2023-05-11 NOTE — OB PST NOTE - NSANTHOSAYNRD_GEN_A_CORE
No. VALENTÍN screening performed.  STOP BANG Legend: 0-2 = LOW Risk; 3-4 = INTERMEDIATE Risk; 5-8 = HIGH Risk

## 2023-05-11 NOTE — OB PST NOTE - NSICDXPASTMEDICALHX_GEN_ALL_CORE_FT
PAST MEDICAL HISTORY:  Bacterial vaginosis     Cholelithiasis     Herpes simplex virus (HSV) infection on Valtrex last outbreak Oct. 2020    Hypothyroidism     Positive GBS test     Rh negative, maternal     Termination of pregnancy (fetus) 2016    UTI (urinary tract infection) tx in past

## 2023-05-11 NOTE — OB PST NOTE - NSHPREVIEWOFSYSTEMS_GEN_ALL_CORE
General: no fever, chills, sweating, anorexia, or weight loss    Skin: no rashes, itching, or dryness    Breast: no tenderness, lumps, or nipple discharge    Opthalmologic: no diplopia, photophobia, or blurred vision    ENMT: no hearing difficulty, ear pain, tinnitus, or vertigo. No sinus symptoms, nasal congestion, nasal discharge, or nasal obstruction    Respiratory and Thorax: no wheezing, dyspnea, or cough    Cardiovascular: no chest pain, palpitations, dyspnea on exertion, orthopnea, or peripheral edema, + bilateral pedal edema     Gastrointestinal: no nausea, vomiting, diarrhea, constipation, change in bowel movements, or abdominal pain    Genitourinary and Pelvis: no hematuria, renal colic, flank pain, dysuria, nocturia. No abnormal vaginal bleeding, vaginal discharge, spotting, pelvic pain, or vaginal leakage    Musculoskeletal: no arthralgia, arthritis, joint swelling, muscle cramping, muscle weakness, neck pain, arm pain, or leg pain    Neurological: no transient paralysis, weakness, paresthesias, or seizures. No syncope, tremors, vertigo, loss of sensation, difficulty walking, loss of consciousness    Psychiatric: no suicidal ideation, depression, anxiety    Hematology: no nose bleeding, easy bruising or bleeding, or skin lumps    Lymphatic: no enlarged or tender lymph nodes, or extremity swelling    Endocrine: no heat or cold intolerance    Immunologic: no recurrent or persistent infections

## 2023-05-15 ENCOUNTER — APPOINTMENT (OUTPATIENT)
Dept: ANTEPARTUM | Facility: CLINIC | Age: 28
End: 2023-05-15

## 2023-05-15 ENCOUNTER — INPATIENT (INPATIENT)
Facility: HOSPITAL | Age: 28
LOS: 2 days | Discharge: HOME | End: 2023-05-18
Attending: OBSTETRICS & GYNECOLOGY | Admitting: OBSTETRICS & GYNECOLOGY

## 2023-05-15 VITALS
RESPIRATION RATE: 17 BRPM | TEMPERATURE: 98 F | SYSTOLIC BLOOD PRESSURE: 124 MMHG | DIASTOLIC BLOOD PRESSURE: 69 MMHG | HEART RATE: 89 BPM

## 2023-05-15 DIAGNOSIS — O26.899 OTHER SPECIFIED PREGNANCY RELATED CONDITIONS, UNSPECIFIED TRIMESTER: ICD-10-CM

## 2023-05-15 DIAGNOSIS — Z98.891 HISTORY OF UTERINE SCAR FROM PREVIOUS SURGERY: Chronic | ICD-10-CM

## 2023-05-15 PROBLEM — N76.0 ACUTE VAGINITIS: Chronic | Status: ACTIVE | Noted: 2023-05-11

## 2023-05-15 PROBLEM — B95.1 STREPTOCOCCUS, GROUP B, AS THE CAUSE OF DISEASES CLASSIFIED ELSEWHERE: Chronic | Status: ACTIVE | Noted: 2023-05-11

## 2023-05-15 PROBLEM — K80.20 CALCULUS OF GALLBLADDER WITHOUT CHOLECYSTITIS WITHOUT OBSTRUCTION: Chronic | Status: ACTIVE | Noted: 2023-05-11

## 2023-05-15 LAB
BASOPHILS # BLD AUTO: 0.02 K/UL — SIGNIFICANT CHANGE UP (ref 0–0.2)
BASOPHILS NFR BLD AUTO: 0.2 % — SIGNIFICANT CHANGE UP (ref 0–2)
BLD GP AB SCN SERPL QL: NEGATIVE — SIGNIFICANT CHANGE UP
EOSINOPHIL # BLD AUTO: 0.1 K/UL — SIGNIFICANT CHANGE UP (ref 0–0.5)
EOSINOPHIL NFR BLD AUTO: 1 % — SIGNIFICANT CHANGE UP (ref 0–6)
HCT VFR BLD CALC: 38.7 % — SIGNIFICANT CHANGE UP (ref 34.5–45)
HGB BLD-MCNC: 11.9 G/DL — SIGNIFICANT CHANGE UP (ref 11.5–15.5)
IANC: 6.31 K/UL — SIGNIFICANT CHANGE UP (ref 1.8–7.4)
IMM GRANULOCYTES NFR BLD AUTO: 0.3 % — SIGNIFICANT CHANGE UP (ref 0–0.9)
LYMPHOCYTES # BLD AUTO: 2.97 K/UL — SIGNIFICANT CHANGE UP (ref 1–3.3)
LYMPHOCYTES # BLD AUTO: 29.6 % — SIGNIFICANT CHANGE UP (ref 13–44)
MCHC RBC-ENTMCNC: 26.7 PG — LOW (ref 27–34)
MCHC RBC-ENTMCNC: 30.7 GM/DL — LOW (ref 32–36)
MCV RBC AUTO: 87 FL — SIGNIFICANT CHANGE UP (ref 80–100)
MONOCYTES # BLD AUTO: 0.6 K/UL — SIGNIFICANT CHANGE UP (ref 0–0.9)
MONOCYTES NFR BLD AUTO: 6 % — SIGNIFICANT CHANGE UP (ref 2–14)
NEUTROPHILS # BLD AUTO: 6.31 K/UL — SIGNIFICANT CHANGE UP (ref 1.8–7.4)
NEUTROPHILS NFR BLD AUTO: 62.9 % — SIGNIFICANT CHANGE UP (ref 43–77)
NRBC # BLD: 0 /100 WBCS — SIGNIFICANT CHANGE UP (ref 0–0)
NRBC # FLD: 0 K/UL — SIGNIFICANT CHANGE UP (ref 0–0)
PLATELET # BLD AUTO: 223 K/UL — SIGNIFICANT CHANGE UP (ref 150–400)
RBC # BLD: 4.45 M/UL — SIGNIFICANT CHANGE UP (ref 3.8–5.2)
RBC # FLD: 15.6 % — HIGH (ref 10.3–14.5)
RH IG SCN BLD-IMP: NEGATIVE — SIGNIFICANT CHANGE UP
WBC # BLD: 10.03 K/UL — SIGNIFICANT CHANGE UP (ref 3.8–10.5)
WBC # FLD AUTO: 10.03 K/UL — SIGNIFICANT CHANGE UP (ref 3.8–10.5)

## 2023-05-15 RX ORDER — AMPICILLIN TRIHYDRATE 250 MG
2 CAPSULE ORAL ONCE
Refills: 0 | Status: COMPLETED | OUTPATIENT
Start: 2023-05-15 | End: 2023-05-15

## 2023-05-15 RX ORDER — AMPICILLIN TRIHYDRATE 250 MG
1 CAPSULE ORAL EVERY 4 HOURS
Refills: 0 | Status: DISCONTINUED | OUTPATIENT
Start: 2023-05-15 | End: 2023-05-16

## 2023-05-15 RX ORDER — SODIUM CHLORIDE 9 MG/ML
1000 INJECTION, SOLUTION INTRAVENOUS
Refills: 0 | Status: DISCONTINUED | OUTPATIENT
Start: 2023-05-15 | End: 2023-05-16

## 2023-05-15 RX ORDER — OXYTOCIN 10 UNIT/ML
333.33 VIAL (ML) INJECTION
Qty: 20 | Refills: 0 | Status: DISCONTINUED | OUTPATIENT
Start: 2023-05-15 | End: 2023-05-16

## 2023-05-15 RX ORDER — SODIUM CHLORIDE 9 MG/ML
1000 INJECTION, SOLUTION INTRAVENOUS ONCE
Refills: 0 | Status: COMPLETED | OUTPATIENT
Start: 2023-05-15 | End: 2023-05-15

## 2023-05-15 RX ORDER — CHLORHEXIDINE GLUCONATE 213 G/1000ML
1 SOLUTION TOPICAL DAILY
Refills: 0 | Status: DISCONTINUED | OUTPATIENT
Start: 2023-05-15 | End: 2023-05-16

## 2023-05-15 RX ADMIN — CHLORHEXIDINE GLUCONATE 1 APPLICATION(S): 213 SOLUTION TOPICAL at 17:23

## 2023-05-15 RX ADMIN — Medication 108 GRAM(S): at 21:13

## 2023-05-15 RX ADMIN — SODIUM CHLORIDE 1000 MILLILITER(S): 9 INJECTION, SOLUTION INTRAVENOUS at 22:45

## 2023-05-15 RX ADMIN — Medication 200 GRAM(S): at 17:23

## 2023-05-15 RX ADMIN — SODIUM CHLORIDE 125 MILLILITER(S): 9 INJECTION, SOLUTION INTRAVENOUS at 17:23
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
## 2023-05-15 NOTE — OB PROVIDER TRIAGE NOTE - NSHPPHYSICALEXAM_GEN_ALL_CORE
T(C): 36.8 (05-15-23 @ 13:59), Max: 36.8 (05-15-23 @ 13:59)  HR: 68 (05-15-23 @ 16:24) (64 - 89)  BP: 106/71 (05-15-23 @ 16:21) (106/62 - 124/69)  RR: 17 (05-15-23 @ 13:59) (17 - 17)  SpO2: 99% (05-15-23 @ 16:24) (92% - 100%)  General: Female sitting comfortably btwn ctx. Breathing through ctx.   Head: Normocephalic. Atraumatic.   Eyes: No discharge, lids normal, conjunctiva normal  Lungs: No resp distress  Abdomen: Soft, nontender. Gravid.   TAUS:  Pt not in worklist, ASOB.   SSE: No erythema, edema, lesions to external genitalia. No lesions at vaginal walls. Brown thick mucous evacuated c/w mucous plug. No active, brisk bleeding.  Negative pooling.    Neuro: No facial asymmetry, no slurred speech, moves all 4 extremities  Mood: Alert and lucid, appropriate mood and affect

## 2023-05-15 NOTE — OB PROVIDER TRIAGE NOTE - HISTORY OF PRESENT ILLNESS
Ms. DIANDRA MCCAIN is a 28y  @ 40w3d p/w ctx q3-5min since last night. Painful but tolerating ctx. Checked , closed. EFW 3600. + FM. Denies lof / vb. GBS positive.   PNC: [facility]. C/b HSV, compliant w valtrex since 36w, last outbreak 10/2020. Hx of c/s , would like to TOLAC, w consents. Denies prenatal issues or complications. Pt reports no hospitalizations, procedures, infections, or new diagnoses in pregnancy. Pt denies complications with blood pressure and/or blood sugar.

## 2023-05-15 NOTE — OB RN PATIENT PROFILE - ANESTHESIA, PREVIOUS REACTION, PROFILE
Pharmacy is requesting refill of patient's Citalopram. Patient was last seen on 9/27/18 and has upcoming appt on 2/12/19. Script has been sent to get patient to his next appt.    none

## 2023-05-15 NOTE — OB PROVIDER TRIAGE NOTE - NSOBPROVIDERNOTE_OBGYN_ALL_OB_FT
Ms. DIANDRA MCCAIN is a 28y  @ 40w3d p/w ctx q3-5min since last night with evidence of labor. Painful but tolerating ctx.. EFW 3600. + FM. Denies lof / vb. GBS positive (), w results on phone.     Plan  - Admit to Labor and Delivery for latent labor with expectant management   - Continuous EFM  - Clear diet, IV fluids  - Consent signed  - TOLAC, 2 units on hold  - GBS positive, amp   - Standard labs (T&S, CBC, RPR, covid serology)  - Currently declines epidural   - Consider re-examination in 4 hours     Informed consent was obtained. The following was discussed:  - Induction/augmentation of labor: use of medication and/or cook balloon to begin or enhance labor  - Obstetrical management including internal fetal/contraction monitoring  - Normal vaginal delivery  - Possible  section    Risks, benefits, alternatives, and possible complications have been discussed in detail with the patient in her native language. Pre-admission, admission, and post admission procedures and expectations were discussed in detail. All questions answered, all appropriate hospital consents were signed. Anticipate normal vaginal delivery.    Plan d/w Dr. Sosa

## 2023-05-16 ENCOUNTER — TRANSCRIPTION ENCOUNTER (OUTPATIENT)
Age: 28
End: 2023-05-16

## 2023-05-16 LAB
COVID-19 SPIKE DOMAIN AB INTERP: POSITIVE
COVID-19 SPIKE DOMAIN ANTIBODY RESULT: >250 U/ML — HIGH
SARS-COV-2 IGG+IGM SERPL QL IA: >250 U/ML — HIGH
SARS-COV-2 IGG+IGM SERPL QL IA: POSITIVE
T PALLIDUM AB TITR SER: NEGATIVE — SIGNIFICANT CHANGE UP

## 2023-05-16 RX ORDER — DIPHENHYDRAMINE HCL 50 MG
25 CAPSULE ORAL EVERY 6 HOURS
Refills: 0 | Status: DISCONTINUED | OUTPATIENT
Start: 2023-05-16 | End: 2023-05-18

## 2023-05-16 RX ORDER — LANOLIN
1 OINTMENT (GRAM) TOPICAL EVERY 6 HOURS
Refills: 0 | Status: DISCONTINUED | OUTPATIENT
Start: 2023-05-16 | End: 2023-05-18

## 2023-05-16 RX ORDER — DIBUCAINE 1 %
1 OINTMENT (GRAM) RECTAL EVERY 6 HOURS
Refills: 0 | Status: DISCONTINUED | OUTPATIENT
Start: 2023-05-16 | End: 2023-05-18

## 2023-05-16 RX ORDER — ACETAMINOPHEN 500 MG
3 TABLET ORAL
Qty: 0 | Refills: 0 | DISCHARGE
Start: 2023-05-16

## 2023-05-16 RX ORDER — IBUPROFEN 200 MG
1 TABLET ORAL
Qty: 0 | Refills: 0 | DISCHARGE
Start: 2023-05-16

## 2023-05-16 RX ORDER — KETOROLAC TROMETHAMINE 30 MG/ML
30 SYRINGE (ML) INJECTION ONCE
Refills: 0 | Status: DISCONTINUED | OUTPATIENT
Start: 2023-05-16 | End: 2023-05-17

## 2023-05-16 RX ORDER — OXYTOCIN 10 UNIT/ML
1 VIAL (ML) INJECTION
Qty: 30 | Refills: 0 | Status: DISCONTINUED | OUTPATIENT
Start: 2023-05-16 | End: 2023-05-16

## 2023-05-16 RX ORDER — SIMETHICONE 80 MG/1
80 TABLET, CHEWABLE ORAL EVERY 4 HOURS
Refills: 0 | Status: DISCONTINUED | OUTPATIENT
Start: 2023-05-16 | End: 2023-05-18

## 2023-05-16 RX ORDER — OXYTOCIN 10 UNIT/ML
2 VIAL (ML) INJECTION
Qty: 30 | Refills: 0 | Status: DISCONTINUED | OUTPATIENT
Start: 2023-05-16 | End: 2023-05-16

## 2023-05-16 RX ORDER — OXYCODONE HYDROCHLORIDE 5 MG/1
5 TABLET ORAL
Refills: 0 | Status: DISCONTINUED | OUTPATIENT
Start: 2023-05-16 | End: 2023-05-18

## 2023-05-16 RX ORDER — BENZOCAINE 10 %
1 GEL (GRAM) MUCOUS MEMBRANE EVERY 6 HOURS
Refills: 0 | Status: DISCONTINUED | OUTPATIENT
Start: 2023-05-16 | End: 2023-05-18

## 2023-05-16 RX ORDER — VALACYCLOVIR 500 MG/1
1 TABLET, FILM COATED ORAL
Refills: 0 | DISCHARGE

## 2023-05-16 RX ORDER — IBUPROFEN 200 MG
600 TABLET ORAL EVERY 6 HOURS
Refills: 0 | Status: COMPLETED | OUTPATIENT
Start: 2023-05-16 | End: 2024-04-13

## 2023-05-16 RX ORDER — MAGNESIUM HYDROXIDE 400 MG/1
30 TABLET, CHEWABLE ORAL
Refills: 0 | Status: DISCONTINUED | OUTPATIENT
Start: 2023-05-16 | End: 2023-05-18

## 2023-05-16 RX ORDER — ONDANSETRON 8 MG/1
4 TABLET, FILM COATED ORAL ONCE
Refills: 0 | Status: COMPLETED | OUTPATIENT
Start: 2023-05-16 | End: 2023-05-16

## 2023-05-16 RX ORDER — PRAMOXINE HYDROCHLORIDE 150 MG/15G
1 AEROSOL, FOAM RECTAL EVERY 4 HOURS
Refills: 0 | Status: DISCONTINUED | OUTPATIENT
Start: 2023-05-16 | End: 2023-05-18

## 2023-05-16 RX ORDER — OXYCODONE HYDROCHLORIDE 5 MG/1
5 TABLET ORAL ONCE
Refills: 0 | Status: DISCONTINUED | OUTPATIENT
Start: 2023-05-16 | End: 2023-05-18

## 2023-05-16 RX ORDER — AER TRAVELER 0.5 G/1
1 SOLUTION RECTAL; TOPICAL EVERY 4 HOURS
Refills: 0 | Status: DISCONTINUED | OUTPATIENT
Start: 2023-05-16 | End: 2023-05-18

## 2023-05-16 RX ORDER — HYDROCORTISONE 1 %
1 OINTMENT (GRAM) TOPICAL EVERY 6 HOURS
Refills: 0 | Status: DISCONTINUED | OUTPATIENT
Start: 2023-05-16 | End: 2023-05-18

## 2023-05-16 RX ORDER — OXYTOCIN 10 UNIT/ML
41.67 VIAL (ML) INJECTION
Qty: 20 | Refills: 0 | Status: DISCONTINUED | OUTPATIENT
Start: 2023-05-16 | End: 2023-05-18

## 2023-05-16 RX ORDER — TETANUS TOXOID, REDUCED DIPHTHERIA TOXOID AND ACELLULAR PERTUSSIS VACCINE, ADSORBED 5; 2.5; 8; 8; 2.5 [IU]/.5ML; [IU]/.5ML; UG/.5ML; UG/.5ML; UG/.5ML
0.5 SUSPENSION INTRAMUSCULAR ONCE
Refills: 0 | Status: DISCONTINUED | OUTPATIENT
Start: 2023-05-16 | End: 2023-05-18

## 2023-05-16 RX ORDER — SODIUM CHLORIDE 9 MG/ML
3 INJECTION INTRAMUSCULAR; INTRAVENOUS; SUBCUTANEOUS EVERY 8 HOURS
Refills: 0 | Status: DISCONTINUED | OUTPATIENT
Start: 2023-05-16 | End: 2023-05-18

## 2023-05-16 RX ORDER — OXYTOCIN 10 UNIT/ML
6 VIAL (ML) INJECTION
Qty: 30 | Refills: 0 | Status: DISCONTINUED | OUTPATIENT
Start: 2023-05-16 | End: 2023-05-16

## 2023-05-16 RX ORDER — CHOLECALCIFEROL (VITAMIN D3) 125 MCG
1 CAPSULE ORAL
Qty: 0 | Refills: 0 | DISCHARGE

## 2023-05-16 RX ORDER — ACETAMINOPHEN 500 MG
975 TABLET ORAL
Refills: 0 | Status: DISCONTINUED | OUTPATIENT
Start: 2023-05-16 | End: 2023-05-18

## 2023-05-16 RX ADMIN — Medication 108 GRAM(S): at 17:25

## 2023-05-16 RX ADMIN — Medication 108 GRAM(S): at 05:03

## 2023-05-16 RX ADMIN — Medication 1 MILLIUNIT(S)/MIN: at 09:25

## 2023-05-16 RX ADMIN — Medication 108 GRAM(S): at 01:32

## 2023-05-16 RX ADMIN — Medication 1 TABLET(S): at 23:40

## 2023-05-16 RX ADMIN — CHLORHEXIDINE GLUCONATE 1 APPLICATION(S): 213 SOLUTION TOPICAL at 13:19

## 2023-05-16 RX ADMIN — Medication 1 MILLIUNIT(S)/MIN: at 03:34

## 2023-05-16 RX ADMIN — Medication 108 GRAM(S): at 13:18

## 2023-05-16 RX ADMIN — Medication 108 GRAM(S): at 09:18

## 2023-05-16 RX ADMIN — ONDANSETRON 4 MILLIGRAM(S): 8 TABLET, FILM COATED ORAL at 16:55

## 2023-05-16 RX ADMIN — SODIUM CHLORIDE 125 MILLILITER(S): 9 INJECTION, SOLUTION INTRAVENOUS at 09:25

## 2023-05-16 RX ADMIN — SODIUM CHLORIDE 125 MILLILITER(S): 9 INJECTION, SOLUTION INTRAVENOUS at 01:32

## 2023-05-16 RX ADMIN — Medication 6 MILLIUNIT(S)/MIN: at 17:59

## 2023-05-16 NOTE — OB PROVIDER LABOR PROGRESS NOTE - NS_SUBJECTIVE/OBJECTIVE_OBGYN_ALL_OB_FT
Patient comfortable with epidural infusing  FHTs CAT 1 ctx q 4-5 mins    cx not examined since not adequate mVU  Pit at 6 mU    40.4 TOLAC augmentation with pitocin  -pt voicing wishing to continue with CASIE  -will recheck in 2 hours.
Patient states contractions feel stronger but still tolerable with epidural    FHTs cat 1 with ctx q 2 mins  pit at 30 mU  cx:7/80/0  stretchy     40.4 week labor augment with previous LTCS  -continue with optimizing pitocin  -position changes/ peanut ball
R1 Labor Note    S: Patient evaluated at bedside for cervical change. Patient had SROM at 630p, clear. Comfortable with epidural at this time.    O:  T(C): 37.0 (05-15-23 @ 18:56), Max: 37.0 (05-15-23 @ 18:56)  HR: 77 (05-15-23 @ 18:55) (64 - 89)  BP: 115/72 (05-15-23 @ 18:55) (106/62 - 124/69)  RR: 16 (05-15-23 @ 18:56) (16 - 17)  SpO2: 96% (05-15-23 @ 16:39) (92% - 100%)
R1 Labor Note    S: Patient evaluated at bedside for cervical change and placement of IUPC. Patient comfortable.    O:  T(C): 36.6 (05-16-23 @ 01:30), Max: 37.0 (05-15-23 @ 18:56)  HR: 84 (05-16-23 @ 03:22) (60 - 95)  BP: 113/64 (05-16-23 @ 03:19) (98/53 - 125/68)  RR: 17 (05-16-23 @ 01:30) (16 - 17)  SpO2: 97% (05-16-23 @ 03:06) (91% - 100%)
R1 Labor & Delivery Progress Note     Pt seen & examined at bedside for repeat VE and adjustment of IUPC. Pt comfortable with epidural.    T(C): 36.6 (05-16-23 @ 09:00), Max: 37.0 (05-15-23 @ 18:56)  HR: 73 (05-16-23 @ 10:06) (60 - 95)  BP: 135/76 (05-16-23 @ 10:03) (97/59 - 135/76)  RR: 17 (05-16-23 @ 09:00) (16 - 18)  SpO2: 100% (05-16-23 @ 10:06) (90% - 100%)
R1 Labor Note    S: Patient evaluated at bedside for cervical change. Patient comfortable with epidural.    O:  T(C): 36.9 (05-15-23 @ 23:06), Max: 37.0 (05-15-23 @ 18:56)  HR: 64 (05-15-23 @ 23:51) (61 - 89)  BP: 108/53 (05-15-23 @ 23:51) (103/52 - 125/68)  RR: 16 (05-15-23 @ 21:15) (16 - 17)  SpO2: 99% (05-15-23 @ 23:51) (91% - 100%)
pt re-examined due to increasing discomfort

## 2023-05-16 NOTE — DISCHARGE NOTE OB - HAVE YOU HAD COVID IN THE LAST 60 DAYS?
Remote Patient Monitoring Note      Date/Time:  1/26/2023 1:37 PM    EMTP reviewed patients reported daily Remote Patient Monitoring metrics. All reported metrics are within alert parameters. Plan/Follow Up:  Will continue to review, monitor and address alerts with follow up based on severity of symptoms and risk factors-- Current Patient Metrics ---- Blood Pressure: 115/68, 77bpm Pulseox: 95%, 81bpm Survey: - Weight: 140.0lbs Note Created at: 01/26/2023 01:37 PM ET ---- Time-Spent: 2 minutes 0 seconds
No

## 2023-05-16 NOTE — DISCHARGE NOTE OB - MEDICATION SUMMARY - MEDICATIONS TO TAKE
I will START or STAY ON the medications listed below when I get home from the hospital:    ibuprofen 600 mg oral tablet  -- 1 tab(s) by mouth every 6 hours  -- Indication: For Encounter for full-term uncomplicated delivery    acetaminophen 325 mg oral tablet  -- 3 tab(s) by mouth 3 times a day as needed for  moderate pain  -- Indication: For Encounter for full-term uncomplicated delivery    PNV Prenatal oral tablet  -- 1 tab(s) by mouth once a day  -- Indication: For Encounter for full-term uncomplicated delivery    levothyroxine 50 mcg (0.05 mg) oral tablet  -- 1 tab(s) by mouth once a day  -- Indication: For Encounter for full-term uncomplicated delivery   I will START or STAY ON the medications listed below when I get home from the hospital:    ibuprofen 600 mg oral tablet  -- 1 tab(s) by mouth every 6 hours as needed for  moderate pain  -- Indication: For Encounter for full-term uncomplicated delivery    acetaminophen 325 mg oral tablet  -- 3 tab(s) by mouth 3 times a day as needed for  moderate pain  -- Indication: For Encounter for full-term uncomplicated delivery    dibucaine 1% topical ointment  -- 1 Apply on skin to affected area every 6 hours As needed Perineal discomfort  -- Indication: For Vaginal pain    witch hazel 50% topical pad  -- 1 Apply on skin to affected area every 4 hours As needed Perineal discomfort  -- Indication: For Vaginal pain

## 2023-05-16 NOTE — OB RN DELIVERY SUMMARY - NS_SEPSISRSKCALC_OBGYN_ALL_OB_FT
EOS calculated successfully. EOS Risk Factor: 0.5/1000 live births ([facility] national incidence); GA=40w4d; Temp=99.32; ROM=26.5; GBS='Positive'; Antibiotics='GBS specific antibiotics > 2 hrs prior to birth'

## 2023-05-16 NOTE — OB PROVIDER LABOR PROGRESS NOTE - NSVAGINALEXAM_OBGYN_ALL_OB_DT
15-May-2023 23:58
16-May-2023 11:30
16-May-2023 03:23
16-May-2023 10:08
16-May-2023 19:43
15-May-2023 20:34

## 2023-05-16 NOTE — DISCHARGE NOTE OB - PATIENT PORTAL LINK FT
You can access the FollowMyHealth Patient Portal offered by [facility] by registering at the following website: [email or web address]. By joining [employer]’s FollowMyHealth portal, you will also be able to view your health information using other applications (apps) compatible with our system.

## 2023-05-16 NOTE — OB NEONATOLOGY/PEDIATRICIAN DELIVERY SUMMARY - NSPEDSNEONOTESA_OBGYN_ALL_OB_FT
Peds called to LDR for category II tracing. 40.3 wk AGA male born via  to a 27 y/o  mother.  Maternal history of hypothyroidism on Synthroid and HSV on Valtrex, -SSE. Maternal labs include Blood Type O- , HIV - , RPR NR , Rubella I , Hep B - , GBS + per patient (received amp x6). SROM at 1830 on 5/15 with clear fluids (ROM hours: 26H 30M).  Baby emerged vigorous, crying, was w/d/s/s with APGARS of 8/8. Nuchal x1. Terminal meconium. Resuscitation included: CPAP max 5/30% for approximately 5 minutes with improvement in color. Mom plans to initiate breastfeeding, consents Hep B vaccine and consents circ.  Highest maternal temp: 36.9. EOS 0.12.    : 23  TOB: 2100  Weight: 3480    Physical Exam:  Gen: no acute distress, +grimace  HEENT:  anterior fontanel open soft and flat, nondysmorphic facies, no cleft lip/palate, ears normal set, no ear pits or tags, nares clinically patent  Resp: Normal respiratory effort without grunting or retractions, good air entry b/l, clear to auscultation bilaterally  Cardio: Present S1/S2, regular rate and rhythm, no murmurs  Abd: soft, non tender, non distended, umbilical cord with 3 vessels  Neuro: +palmar and plantar grasp, +suck, +briseida, normal tone  Extremities: negative barrientos and ortolani maneuvers, moving all extremities, no clavicular crepitus or stepoff  Skin: pink, warm  Genitals: Normal male anatomy, testicles palpable in scrotum b/l, Dorian 1, anus patent

## 2023-05-16 NOTE — OB RN DELIVERY SUMMARY - NSSELHIDDEN_OBGYN_ALL_OB_FT
[[email or web address]_OB_FT:[id number]],[NS_[email or web address]_ALL_OB_FT:[id number]],[NS_DeliveryRN_OBGYN_ALL_OB_FT:[id number]=]

## 2023-05-16 NOTE — DISCHARGE NOTE OB - CARE PLAN
1 Principal Discharge DX:	Vaginal birth after  ()  Assessment and plan of treatment:	normal  and postpartum care

## 2023-05-16 NOTE — OB PROVIDER LABOR PROGRESS NOTE - ASSESSMENT
-repositioned on right side w/ peanut due to remaining cervical lip on right side only  -anesthesia investigating epidural as one sided coverage only  -category 1 tracing    [facility]
A/P 28y  @40w3d TOLAC  -patient making cervical change, s/p SROM at 630p (clear), c/w exp mgmt and repeat VE in 3-4hr or sooner PRN  -Cat 1 tracing  -GBS pos on amp  -EFW 3600  -2u on hold for TOLAC  -Analgesia, not requesting epi at this time  -Anticipate     d/w Dr. Tierney Lopez, PGY-1  
CNM OB Progress Note    Patient seen and evaluated at bedside.  Reports one-sided left contractons.  Comfortable w/ anesthesia epidural.      T(C): 36.4 (05-16-23 @ 11:00), Max: 37.0 (05-15-23 @ 18:56)  HR: 69 (05-16-23 @ 12:03) (60 - 95)  BP: 102/55 (05-16-23 @ 11:48) (97/59 - 135/76)  RR: 18 (05-16-23 @ 11:00) (16 - 18)  SpO2: 100% (05-16-23 @ 11:51) (90% - 100%)    SVE: 6.5/80/-2      A/P 28y P1 @ 40.3wks admitted in labor, for TOLAC. Low FHR baseline noted.      -Labor: cat 1 tracing  -Fetus: EFW 3322g  -GBS positive  -Analgesia: anesthesia epidural  -Induction with: IV Pitocin    Pitocin currently at 16u  reposition PRN, patient in high fowlers per choice  Continue to increase pitocin 1u x 1u for cat 1 tracing  IUPC in place, readjusted at time of exam    -Continue to monitor with EFM & IUPC  -Recheck patient in 2-4 hours or PRN    Discussed with MD Ian Barragan  
A/P 28y  @40w3d TOLAC  -patient making cervical change, s/p SROM at 630p (clear), c/w exp mgmt and repeat VE in 2h - if not making cervical change, place IUPC and start pitocin  -Cat 1 tracing  -GBS pos on amp  -EFW 3600  -2u on hold for TOLAC  -Analgesia epi  -Anticipate     d/w Dr. Tierney Lopez, PGY-1
Plan: 28y y/o  @40w4d in stable condition  - IUPC further advanced into uterus, not replaced at this time  - Con't IOL with Pitocin, uptitrate as contraction pattern allows  - Continuous EFM, [place]  - Con't IVF    d/w attending physician Dr. Ian Menjivar PGY1
A/P 28y  @40w3d TOLAC  -s/p SROM at 630p (clear), IUPC placed, for pitocin  -Cat 2 tracing with minimal variability, improved after maternal repositioning and with scalp stim  -GBS pos on amp  -EFW 3600  -2u on hold for TOLAC  -Analgesia epi  -Anticipate     d/w Dr. Tierney Lopez, PGY-1

## 2023-05-16 NOTE — OB PROVIDER DELIVERY SUMMARY - NSSELHIDDEN_OBGYN_ALL_OB_FT
[NS_[email or web address]_ALL_OB_FT:[id number]],[NS_DeliveryAssist1_OBGYN_ALL_OB_FT:[id number]]

## 2023-05-16 NOTE — OB PROVIDER DELIVERY SUMMARY - NS_BEFORE39WEEKS_OBGYN_ALL_OB
INTERNAL MEDICINE RESIDENCY PROGRESS NOTE     Name: Jaskaran Lubin   Age & Sex: 76 y o  female   MRN: [id number]  Unit/Bed#: [facility] 708-01   Encounter: [id number]  Team: SOD Team A    PATIENT INFORMATION     Name: Jaskaran Lubin   Age & Sex: 76 y o  female   MRN: [id number]  Hospital Stay Days: 2    ASSESSMENT/PLAN     Principal Problem:    Abdominal pain  Active Problems:    Chronic lumbar radiculopathy    Diabetes mellitus, type II ([facility] )    Diabetic neuropathy ([facility] )    Hepatic cirrhosis due to chronic hepatitis C infection ([facility] )    Hepatocellular carcinoma (HCC)    Nicotine dependence    Opioid dependence ([place] [address] )    Pain syndrome, chronic    Hyponatremia    Decompensated hepatic cirrhosis (HCC)    Portal vein thrombosis    Flatulence      Portal vein thrombosis  Assessment & Plan  Previously on anticoagulation however patient developed serious life-threatening varices bleed with a hemoglobin down to 4 0 therefore anticoagulation at this point was contraindicated based on her clear bleeding risk  Will manage conservatively  Recent imaging does suggest some extension of portal vein thrombi, questionable whether this is related to her abdominal pain  Decompensated hepatic cirrhosis ([facility] )  Assessment & Plan  Minimal to mild ascites on recent imaging  No indication for paracentesis at this time  Does have varices with history of varices bleed this year contraindicating anticoagulation  GI consulted, appreciate recommendations  continue lactulose and rifaximin   Consulted IR for paracentesis but unable to perform due to minimal ascitic fluid on U/S  Started on IV abx for SBP, transitioned to oral cipro due to lose of IV access  Will add flagyl 500mg q8 to cover for cholecystitis  Hyponatremia  Assessment & Plan  Sodium of 125 this AM, patient is a cirrhotic with chronic hyponatremia, continues to be alert and oriented x3  Poor p o  Intake noted with anorexia    Low solute intake leading to hyponatremia and is a cirrhotic patient with chronic hyponatremia  Recheck BMP in a m  And will continue diuretics of Lasix 60 mg and spironolactone 150 mg daily for now  Plan: will continue to monitor as patient is a cirrhotic and can handle hyponatremia, and she is known to have chronic hyponatremia  2L fluid restriction  Will liberate sodium to increase solute intake  Pain syndrome, chronic  Assessment & Plan  Stable in patient with terminal condition  Continue outpatient oxycodone  Opioid dependence ([facility] )  Assessment & Plan  On chronic opiate therapy with oxycodone 10 mg q 6 hours p r n  And will continue this while in hospital   Follows with pain management and palliative care  Nicotine dependence  Assessment & Plan  Nicotine patch daily p r n  Hepatocellular carcinoma [facility])  Assessment & Plan  Outpatient follow-up with Dr Martina Malhotra of Surgical Oncology  Hepatic cirrhosis due to chronic hepatitis C infection ([facility] )  Assessment & Plan  Minimal to mild ascites on recent imaging  Following with GI  Outpatient EGD for monitoring of varices and esophageal candidiasis  Diabetic neuropathy [facility])  Assessment & Plan  Lab Results   Component Value Date    HGBA1C 5 4 03/09/2019     Continue gabapentin 300 mg q a m , 900 mg total q h s         Diabetes mellitus, type II [facility])  Assessment & Plan  Lab Results   Component Value Date    HGBA1C 5 4 03/09/2019       Hyperglycemic with a blood glucose in the 140s on admission  No indication for insulin therapy while in hospital here at this time  Monitor glucose on BMP in the a m  Anthony Zhang Chronic lumbar radiculopathy  Assessment & Plan  Follows with pain management outpatient  On stable regimen of oxycodone and gabapentin which will be continued  * Abdominal pain  Assessment & Plan  Recurrence, pain is waxing and waning quality    There are multiple etiologies that could explain this pain such as gallbladder distention and cholestasis seen on her more recent imaging of the abdomen  Additionally could be stretching of Cristi's capsule of the liver given her cirrhosis and hepatic inflammation versus extensive and increased portal venous thrombus/thrombi  Continue current pain regimen of oxycodone 10 mg q 6 hours p r n  Continue simethicone  Continue Zofran 4 mg IV p r n  Nausea    Febrile to 101 5 on , only one blood culture able to be drawn, negative at 24 hours  Started on Rocephin 2g, given only for one day before losing IV acess, has been converted to oral cipro 500mg BID  Consider SBP vs  Acute cholecystitis  Plan: continue day 2 of cipro, total day 3 antibiotics  Consider adding flagyl for suspected cholecystitis  Flatulence: Patient with abdominal distension, belching, and flatulence    Plan: will schedule simethicone with each meal        Disposition: Requires further inpatient stay for concern for SBP  SUBJECTIVE     Patient seen and examined  No acute events overnight  She states she has been belching often  She continues to have abdominal pain in ruq and epigastric area  She describes a cramping diffuse abdominal pain which is relatively constant  She also reports a sharp pain that comes and goes in the ruq, does not think it is brought on or made worse after eating, cannot quantify how long the pain lasts when it comes, but states it is intermittent  She denies any nausea or vomiting  She reports her last bowel movement was on  morning, and it was very loose       OBJECTIVE     Vitals:    19 2100 19 2242 19 2300 19 0740   BP: 95/67 96/66  97/64   BP Location:  Right arm     Pulse: 75  69 63   Resp:       Temp:  98 4 °F (36 9 °C)  98 2 °F (36 8 °C)   TempSrc:  Oral     SpO2: 96%  97% 96%   Weight:       Height:          Temperature:   Temp (24hrs), Av 3 °F (36 8 °C), Min:98 2 °F (36 8 °C), Max:98 4 °F (36 9 °C)    Temperature: 98 2 °F (36 8 °C)  Intake & Output:  I/O        0701 - 05/26 0700 05/26 0701 - 05/27 0700 05/27 0701 - 05/28 0700    P  O  0 958     IV Piggyback 50      Total Intake(mL/kg) 50 (0 7) 958 (14 4)     Urine (mL/kg/hr) 100 (0 1)      Total Output 100      Net -50 +958                Weights:   IBW: 52 4 kg    Body mass index is 26 04 kg/m²  Weight (last 2 days)     None        Physical Exam   Constitutional: She is oriented to person, place, and time  No distress  HENT:   Head: Normocephalic and atraumatic  Mouth/Throat: No oropharyngeal exudate  Eyes: No scleral icterus  Neck: Normal range of motion  Neck supple  No JVD present  No tracheal deviation present  No thyromegaly present  Cardiovascular: Normal rate, regular rhythm, normal heart sounds and intact distal pulses  Exam reveals no gallop and no friction rub  No murmur heard  Pulmonary/Chest: Effort normal and breath sounds normal  No stridor  No respiratory distress  She has no wheezes  She has no rales  She exhibits no tenderness  Abdominal: Soft  Bowel sounds are normal  She exhibits distension  She exhibits no mass  There is tenderness  There is no rebound and no guarding  No hernia  Tenderness to palpation in epigastric area and in RUQ  Musculoskeletal: She exhibits edema  She exhibits no tenderness or deformity  Mild edema bilaterally   Lymphadenopathy:     She has no cervical adenopathy  Neurological: She is alert and oriented to person, place, and time  Skin: Skin is warm and dry  No rash noted  She is not diaphoretic  No erythema  No pallor  Nursing note and vitals reviewed  LABORATORY DATA     Labs: I have personally reviewed pertinent reports    Results from last 7 days   Lab Units 05/27/19  0756 05/26/19  1113 05/25/19  0830   WBC Thousand/uL 13 06* 15 64* 14 88*   HEMOGLOBIN g/dL 9 5* 9 3* 10 2*   HEMATOCRIT % 29 4* 29 2* 31 3*   PLATELETS Thousands/uL 386 374 365   NEUTROS PCT % 78* 82* 81*   MONOS PCT % 12 10 10      Results from last 7 days   Lab Units 05/27/19  0756 05/26/19  1113 05/25/19  0830   POTASSIUM mmol/L 4 4 4 5 3 9   CHLORIDE mmol/L 94* 96* 94*   CO2 mmol/L 25 25 27   BUN mg/dL 9 11 8   CREATININE mg/dL 0 86 0 84 0 81   CALCIUM mg/dL 9 1 8 7 9 1   ALK PHOS U/L 337* 313* 365*   ALT U/L 19 21 34   AST U/L 80* 103* 254*     Results from last 7 days   Lab Units 05/24/19  1135   MAGNESIUM mg/dL 2 0          Results from last 7 days   Lab Units 05/21/19  1511   INR  1 15   PTT seconds 36               IMAGING & DIAGNOSTIC TESTING     Radiology Results: I have personally reviewed pertinent reports  Us Abdomen Limited    Result Date: 5/25/2019  Impression: Mild ascites as above  Workstation performed: [id number]     Us Right Upper Quadrant    Result Date: 5/26/2019  Impression: Gallbladder sludge, with trace pericholecystic fluid and positive sonographic Ruff's sign suggestive of acute cholecystitis  Nonvisualized common bile duct  Cirrhotic liver again seen, as well as thrombosed main portal vein  Workstation performed: [id number]     Other Diagnostic Testing: I have personally reviewed pertinent reports      ACTIVE MEDICATIONS     Current Facility-Administered Medications   Medication Dose Route Frequency    ciprofloxacin (CIPRO) tablet 500 mg  500 mg Oral Q12H [address]    dicyclomine (BENTYL) tablet 20 mg  20 mg Oral 4x Daily (AC & HS)    furosemide (LASIX) tablet 20 mg  20 mg Oral TID    gabapentin (NEURONTIN) capsule 300 mg  300 mg Oral QAM    gabapentin (NEURONTIN) capsule 900 mg  900 mg Oral HS    lactulose 20 g/30 mL oral solution 10 g  10 g Oral BID    metroNIDAZOLE (FLAGYL) tablet 500 mg  500 mg Oral Q8H [address]    nadolol (CORGARD) tablet 20 mg  20 mg Oral Daily    nicotine (NICODERM CQ) 21 mg/24 hr TD 24 hr patch 1 patch  1 patch Transdermal Q24H    ondansetron (ZOFRAN) injection 4 mg  4 mg Intravenous Q6H PRN    oxyCODONE (ROXICODONE) IR tablet 5 mg  5 mg Oral Q6H PRN    pantoprazole (PROTONIX) EC tablet 40 mg  40 mg Oral BID AC    polyethylene glycol (MIRALAX) packet 17 g  17 g Oral Daily PRN    potassium chloride (K-DUR,KLOR-CON) CR tablet 20 mEq  20 mEq Oral BID    rifaximin (XIFAXAN) tablet 550 mg  550 mg Oral Q12H [facility]    simethicone (MYLICON) chewable tablet 80 mg  80 mg Oral 4x Daily (with meals and at bedtime)    spironolactone (ALDACTONE) tablet 50 mg  50 mg Oral TID       VTE Pharmacologic Prophylaxis: Sequential compression device (Venodyne)   VTE Mechanical Prophylaxis: sequential compression device    Portions of the record may have been created with voice recognition software  Occasional wrong word or "sound a like" substitutions may have occurred due to the inherent limitations of voice recognition software    Read the chart carefully and recognize, using context, where substitutions have occurred   ==  Jose Gurrola, [address] [facility]  Internal Medicine Residency PGY-1 No

## 2023-05-16 NOTE — OB PROVIDER LABOR PROGRESS NOTE - NS_OBIHIFHRDETAILS_OBGYN_ALL_OB_FT
110/mod/+accels/-decels
baseline 120, mod onesimo, +accels
130/mod/+accels/-decels
140/mod/+accels/-decels
EFM: 110/mod. variability/+accels/-decels

## 2023-05-16 NOTE — OB PROVIDER DELIVERY SUMMARY - NSPROVIDERDELIVERYNOTE_OBGYN_ALL_OB_FT
Spontaneous vaginal delivery of liveborn infant from direct OP position. Head, shoulders, and body delivered easily. Loose nuchal x1.  Infant was suctioned. Ternminal mec. Cord was clamped and cut and infant was passed to mother. Placenta delivered intact with a 3 vessel cord. Fundal massage was given and uterine fundus was found to be firm.  On bimanual exam, hysterotomy found to be intact. Vaginal exam revealed an intact cervix, vaginal walls and sulci. Patient had a 2nd degree laceration in the perineum that was repaired with 2.0 chromic suture. Excellent hemostasis was noted. patient was stable and went to recovery. Count was correct x 2.

## 2023-05-16 NOTE — DISCHARGE NOTE OB - CARE PROVIDER_API CALL
Angelica Berman)  Obstetrics and Gynecology  [address] [place] [address]  Phone: [phone]  Fax: [phone]  Follow Up Time: 1 month

## 2023-05-17 RX ORDER — IBUPROFEN 200 MG
600 TABLET ORAL EVERY 6 HOURS
Refills: 0 | Status: DISCONTINUED | OUTPATIENT
Start: 2023-05-17 | End: 2023-05-18

## 2023-05-17 RX ORDER — SENNA PLUS 8.6 MG/1
1 TABLET ORAL DAILY
Refills: 0 | Status: DISCONTINUED | OUTPATIENT
Start: 2023-05-17 | End: 2023-05-18

## 2023-05-17 RX ORDER — LEVOTHYROXINE SODIUM 125 MCG
50 TABLET ORAL DAILY
Refills: 0 | Status: DISCONTINUED | OUTPATIENT
Start: 2023-05-17 | End: 2023-05-18

## 2023-05-17 RX ADMIN — Medication 600 MILLIGRAM(S): at 13:01

## 2023-05-17 RX ADMIN — Medication 600 MILLIGRAM(S): at 05:17

## 2023-05-17 RX ADMIN — Medication 1 TABLET(S): at 13:01

## 2023-05-17 RX ADMIN — Medication 600 MILLIGRAM(S): at 14:01

## 2023-05-17 RX ADMIN — Medication 50 MICROGRAM(S): at 05:40

## 2023-05-17 RX ADMIN — SODIUM CHLORIDE 3 MILLILITER(S): 9 INJECTION INTRAMUSCULAR; INTRAVENOUS; SUBCUTANEOUS at 12:30

## 2023-05-17 RX ADMIN — Medication 30 MILLIGRAM(S): at 00:01

## 2023-05-17 RX ADMIN — Medication 600 MILLIGRAM(S): at 05:47

## 2023-05-17 RX ADMIN — SODIUM CHLORIDE 3 MILLILITER(S): 9 INJECTION INTRAMUSCULAR; INTRAVENOUS; SUBCUTANEOUS at 21:27

## 2023-05-17 RX ADMIN — SODIUM CHLORIDE 3 MILLILITER(S): 9 INJECTION INTRAMUSCULAR; INTRAVENOUS; SUBCUTANEOUS at 06:09

## 2023-05-17 RX ADMIN — Medication 975 MILLIGRAM(S): at 23:00

## 2023-05-17 RX ADMIN — Medication 975 MILLIGRAM(S): at 22:29

## 2023-05-17 RX ADMIN — Medication 30 MILLIGRAM(S): at 00:31

## 2023-05-17 NOTE — LACTATION INITIAL EVALUATION - LACTATION INTERVENTIONS
reviewed  with  mother  history of pcos  and observing  for milk supply./initiate/review safe skin-to-skin/initiate/review hand expression/initiate/review techniques for position and latch/initiate/review supplementation plan due to medical indications/review techniques to increase milk supply/review techniques to manage sore nipples/engorgement/initiate/review finger suck/initiate/review breast massage/compression/reviewed components of an effective feeding and at least 8 effective feedings per day required/reviewed importance of monitoring infant diapers, the breastfeeding log, and minimum output each day/reviewed risks of unnecessary formula supplementation/reviewed risks of artificial nipples/reviewed benefits and recommendations for rooming in/reviewed feeding on demand/by cue at least 8 times a day/recommended follow-up with pediatrician within 24 hours of discharge/reviewed indications of inadequate milk transfer that would require supplementation

## 2023-05-18 VITALS
OXYGEN SATURATION: 99 % | SYSTOLIC BLOOD PRESSURE: 110 MMHG | TEMPERATURE: 98 F | RESPIRATION RATE: 18 BRPM | HEART RATE: 86 BPM | DIASTOLIC BLOOD PRESSURE: 69 MMHG

## 2023-05-18 RX ORDER — AER TRAVELER 0.5 G/1
1 SOLUTION RECTAL; TOPICAL
Qty: 0 | Refills: 0 | DISCHARGE
Start: 2023-05-18

## 2023-05-18 RX ORDER — DIBUCAINE 1 %
1 OINTMENT (GRAM) RECTAL
Qty: 0 | Refills: 0 | DISCHARGE
Start: 2023-05-18

## 2023-05-18 RX ORDER — LEVOTHYROXINE SODIUM 125 MCG
1 TABLET ORAL
Qty: 0 | Refills: 0 | DISCHARGE

## 2023-05-18 RX ADMIN — SODIUM CHLORIDE 3 MILLILITER(S): 9 INJECTION INTRAMUSCULAR; INTRAVENOUS; SUBCUTANEOUS at 04:31

## 2023-05-18 RX ADMIN — Medication 50 MICROGRAM(S): at 04:35

## 2023-05-18 RX ADMIN — Medication 600 MILLIGRAM(S): at 04:35

## 2023-05-18 RX ADMIN — Medication 600 MILLIGRAM(S): at 05:00

## 2023-05-18 RX ADMIN — Medication 600 MILLIGRAM(S): at 12:47

## 2023-05-18 RX ADMIN — Medication 600 MILLIGRAM(S): at 13:20

## 2023-05-18 RX ADMIN — SENNA PLUS 1 TABLET(S): 8.6 TABLET ORAL at 00:32

## 2023-05-18 RX ADMIN — SODIUM CHLORIDE 3 MILLILITER(S): 9 INJECTION INTRAMUSCULAR; INTRAVENOUS; SUBCUTANEOUS at 17:54

## 2023-05-18 NOTE — PROGRESS NOTE ADULT - ASSESSMENT
NP: MELISA day 1 Progress Note:     Patient seen at bedside resting comfortably offers no current complaints. Ambulating and voiding without difficulty.  Passing flatus and tolerating regular diet.  Bonding well with .  Breastfeeding exclusively . Denies HA, CP, SOB, N/V/D, dizziness, palpitations,  worsening vaginal bleeding, or any other concerns.      Vital Signs Last 24 Hrs  T(C): 36.8 (17 May 2023 06:00), Max: 37.4 (16 May 2023 18:00)  T(F): 98.2 (17 May 2023 06:00), Max: 99.32 (16 May 2023 18:00)  HR: 78 (17 May 2023 06:00) (60 - 107)  BP: 106/62 (17 May 2023 06:00) (89/44 - 127/58)  BP(mean): --  RR: 18 (17 May 2023 06:00) (18 - 20)  SpO2: 100% (17 May 2023 06:00) (83% - 100%)    Parameters below as of 17 May 2023 06:00  Patient On (Oxygen Delivery Method): room air        Physical Exam:     Gen: A&Ox 3, NAD  Chest: CTA B/L  Cardiac: S1,S2; RRR  Breast: Soft, nontender, nonengorged  Abdomen: +BS, Soft, nontender, ND; Fundus firm below umbilicus  Gyn: mod lochia, intact/repaired  Ext: Nontender, DTRS 2+, no worsening edema                          11.9   10.03 )-----------( 223      ( 15 May 2023 17:25 )             38.7       A/P: 28yr old F PPD#1 s/p  on 2023.     - Meeting PP milestones   - Continue pain management  - Encourage OOB and ambulation  - Continue current care  - Plan for discharge tomorrow  - d/w dr Tierney Avila NP  Office #: [phone]    
A/P: 28yr old F PPD#2 s/p  on 2023. RH neg, s/p Rhogam. Hypothyroidism.    Her pain is well controlled.   She is tolerating a regular diet and passing flatus.   Denies N/V. Denies CP/SOB/lightheadedness/dizziness.   She is ambulating without difficulty.   Voiding spontaneously.    Patient is stable and doing well postpartum  - Continue regular diet.  - Increase ambulation.  - Continue motrin, tylenol, oxycodone PRN for pain control.   -Encourage breastfeeding.    -PP educational material reviewed and provided.  -PP & PPD Instructions reviewed.    -Discharge planning>>>D/C home today    -Follow up @ [facility] in 6 weeks for postpartum visit  [phone]    Discussed with MD Tierney Barragan

## 2023-05-18 NOTE — PROGRESS NOTE ADULT - SUBJECTIVE AND OBJECTIVE BOX
Post-partum Note,   She is a  28y woman who is now post-partum day: 2    Subjective:  The patient feels well.  She is ambulating.   She is tolerating regular diet.  She denies nausea and vomiting; denies fever.  She is voiding.  Her pain is controlled.  She reports normal postpartum bleeding.  She is breastfeeding.  She is formula feeding.  She is bonding with infant.    Physical exam:    Vital Signs Last 24 Hrs  T(C): 36.6 (18 May 2023 06:34), Max: 36.8 (17 May 2023 17:10)  T(F): 97.8 (18 May 2023 06:34), Max: 98.2 (17 May 2023 17:10)  HR: 81 (18 May 2023 06:34) (78 - 81)  BP: 108/71 (18 May 2023 06:34) (108/71 - 111/68)  BP(mean): --  RR: 19 (18 May 2023 06:34) (18 - 19)  SpO2: 99% (18 May 2023 06:34) (99% - 100%)    Parameters below as of 18 May 2023 06:34  Patient On (Oxygen Delivery Method): room air        Gen: NAD  Breast: Soft, nontender, not engorged.  Abdomen: Soft, nontender, no distension , firm uterine fundus at umbilicus.  Pelvic: Normal lochia noted  Ext: No calf tenderness    LABS:      Rubella status:     Allergies    No Known Allergies    Intolerances      MEDICATIONS  (STANDING):  acetaminophen     Tablet .. 975 milliGRAM(s) Oral <User Schedule>  diphtheria/tetanus/pertussis (acellular) Vaccine (Adacel) 0.5 milliLiter(s) IntraMuscular once  ibuprofen  Tablet. 600 milliGRAM(s) Oral every 6 hours  levothyroxine 50 MICROGram(s) Oral daily  oxytocin Infusion 41.667 milliUNIT(s)/Min (125 mL/Hr) IV Continuous <Continuous>  prenatal multivitamin 1 Tablet(s) Oral daily  senna 1 Tablet(s) Oral daily  sodium chloride 0.9% lock flush 3 milliLiter(s) IV Push every 8 hours    MEDICATIONS  (PRN):  benzocaine 20%/menthol 0.5% Spray 1 Spray(s) Topical every 6 hours PRN for Perineal discomfort  dibucaine 1% Ointment 1 Application(s) Topical every 6 hours PRN Perineal discomfort  diphenhydrAMINE 25 milliGRAM(s) Oral every 6 hours PRN Pruritus  hydrocortisone 1% Cream 1 Application(s) Topical every 6 hours PRN Moderate Pain (4-6)  lanolin Ointment 1 Application(s) Topical every 6 hours PRN nipple soreness  magnesium hydroxide Suspension 30 milliLiter(s) Oral two times a day PRN Constipation  oxyCODONE    IR 5 milliGRAM(s) Oral once PRN Moderate to Severe Pain (4-10)  oxyCODONE    IR 5 milliGRAM(s) Oral every 3 hours PRN Moderate to Severe Pain (4-10)  pramoxine 1%/zinc 5% Cream 1 Application(s) Topical every 4 hours PRN Moderate Pain (4-6)  simethicone 80 milliGRAM(s) Chew every 4 hours PRN Gas  witch hazel Pads 1 Application(s) Topical every 4 hours PRN Perineal discomfort      
ANESTHESIA POST-EPIDURAL CHECK    28y Female s/p  DAY 1     No COMPLAINTS    NO APPARENT ANESTHESIA COMPLICATIONS

## 2025-06-10 NOTE — OB RN PATIENT PROFILE - POST PARTUM DEPRESSION SCREEN OB 3
[facility]  /A  PROGRESS NOTE   Patient: Jorge Luis Solorzano  Today's Date: 6/10/2025    YOB: 1967  Admission Date: 6/8/2025    MRN: [id number]  Inpatient LOS: 2    Attending: Yaakov Abad MD  Hospital Day: Hospital Day: 3    Subjective   HISTORY AND SUBJECTIVE COMPLAINTS     Chief Complaint:    Abdominal distention     Interval History / Subjective:    NAEO, still with some sob.     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  That he underwent paracentesis procedure on 5/31/2025 following which he was advised to increase his fluid intake.  Patient left for [place] without his medications and was unable to come back for 2 days because of weather conditions.  He therefore missed his diuretic medications for 2 days.  He also complains of cough and shortness of breath for the past 2 days.  Shortness of breath mainly occurs with physical activity such as walking.  He also reports pleuritic chest pain.  Patient does not report any fever or abdominal pain.  Last drink of alcohol was 2 days ago.  Patient does not report any tremors or palpitations or anxiety at this time.   Patient was noted to have temperature of 100 °F, heart rate of 120/min, blood pressure 127/74 mmHg, respiratory rate of 20/min.  Oxygen saturation was 98% on room air.  WBC count was 5300, hemoglobin was 9.9 g per DL with platelet count 201,000.  Initial lactic acid was 3.3 and decreased to 2.4 after receiving IV albumin and 500 mL of IV normal saline.  Procalcitonin was 0.25, alcohol was undetectable, INR was 1.6.  Patient was noted to have potassium of 2.7 with magnesium of 1.7, both of which are currently being  corrected.  X-ray chest Small right pleural effusion. Small to moderate size left pleural effusion with adjacent hazy airspace opacities, which may be due to atelectasis or pneumonia. No pneumothorax.  He underwent paracentesis in the ER.  He was started on IV vancomycin along with cefepime in ER he is being admitted for further evaluation.     Patient follows with [facility] and is being admitted to Hospitalist Service Because their service is capped.    ROS:  Pertinent systems negative except as above.    Objective   PHYSICAL EXAMINATION     Vital 24 Hour Range Most Recent Value   Temperature Temp  Min: 98.4 °F (36.9 °C)  Max: 98.9 °F (37.2 °C) 98.9 °F (37.2 °C)   Pulse Pulse  Min: 95  Max: 104 (!) 104   Respiratory Resp  Min: 18  Max: 18 18   Blood Pressure BP  Min: 100/67  Max: 105/73 102/63   Pulse Oximetry SpO2  Min: 100 %  Max: 100 % 100 %   Arterial BP No data recorded     O2 No data recorded       Recorded Intake and Output:  Intake/Output Summary (Last 24 hours) at 6/10/2025 0802  Last data filed at 6/9/2025 1300  Gross per 24 hour   Intake 770 ml   Output --   Net 770 ml      Recorded Last Stool Occurrence:       Vital Most Recent Value First Value   Weight 48.4 kg (106 lb 9.6 oz) Weight: 50.8 kg (111 lb 15.9 oz)   Height 5' 6\" (167.6 cm) Height: 5' 6\" (167.6 cm)   BMI 17.21 N/A         General:  Patient is sitting in bed watching TV. No acute distress.   HEENT: Normocephalic, atraumatic, PERRL, intact extraocular movement.  Neck:  Trachea is midline. No adenopathy.    Cardiovascular:  Regular rate and rhythm, normal S1, S2  Respiratory: B/L air entry.  No wheezes, rales or rhonchi. On room air  Gastrointestinal:  Soft, tenderness at previous paracentesis site (mild), mild distention.   Neuro:   Alert and Oriented to time, place, person. CN II-XII intact. no focal weakness or sensory deficits.  Psychiatric:   Cooperative.  Appropriate mood & affect.  Normal judgment.  Skin:  Warm and dry without  rash.  Extremities: No pitting edema of the lower extremities bilaterally, palpable pulses.      TEST RESULTS     Labs: The Laboratory values listed below have been reviewed and pertinent findings discussed in the Assessment and Plan.    Laboratory values:   Recent Labs   Lab 06/10/25  0457 06/09/25  0550 06/08/25  1811   WBC 4.2 3.9* 5.3   HGB 8.3* 8.6* 9.9*   HCT 25.2* 25.9* 29.1*   PLT 86* 88* 101*         Recent Labs   Lab 06/10/25  0550 06/09/25  2222 06/09/25  0550 06/08/25  1821 06/08/25  1811   SODIUM 136  --  135  --  133*   POTASSIUM 3.9 3.5 3.4  --  2.8*   CHLORIDE 107  --  105  --  100   CO2 26  --  24  --  28   CALCIUM 7.4*  --  7.3*  --  7.5*   GLUCOSE 100*  --  92  --  79   BUN <5*  --  <5*  --  5*   CREATININE 0.52*  --  0.49* 0.60* 0.54*   MG 1.8  --  2.0  --  1.7        Recent Labs   Lab 06/10/25  0550 06/09/25  0550 06/08/25  1811   ALBUMIN 2.9* 2.9* 2.7*   AST 41* 50* 76*   GPT 20 24 33   BILIRUBIN 2.4* 3.1* 2.6*   DBIL  --  1.0*  --      Recent Labs   Lab 06/08/25  2349 06/08/25  1811   PCT  --  0.25*   LACTA 1.5  --       Lab Results   Component Value Date    VB12 1,170 (H) 06/07/2024    OSIRIS 9.3 06/07/2024    VITD25 9.0 (L) 02/27/2024    TSH 1.867 08/14/2024        Lab Results   Component Value Date    CHOLESTEROL 51 06/01/2025    HDL 21 (L) 06/01/2025    CALCLDL 22 06/01/2025         Recent Labs   Lab 06/10/25  0457 06/08/25  1811   PT 19.8* 16.2*   INR 2.0 1.6   PTT  --  30         Radiology: Imaging studies have been reviewed and pertinent findings discussed in the Assessment and Plan.  Results for orders placed or performed during the hospital encounter of 06/08/25 (from the past 48 hours)   XR CHEST PA OR AP 1 VIEW    Impression    IMPRESSION:      The cardiomediastinal silhouette is within normal limits. Pulmonary vessels  are normally distributed.    Small right pleural effusion. Small to moderate size left pleural effusion  with adjacent hazy airspace opacities, which may be due to  atelectasis or  pneumonia. No pneumothorax.          Electronically Signed by: Viviana Delcid MD  Signed on: 6/8/2025 6:42 PM  Created on Workstation ID: [id number]  Signed on Workstation ID: [id number]   IR PARACENTESIS    Impression    IMPRESSION: Paracentesis under ultrasound guidance with removal of 2.8  liters of abdominal fluid for diagnostic and therapeutic purposes.      Electronically Signed by: Emiliano Bruno MD  Signed on: 6/9/2025 2:15 PM  Created on Workstation ID: [id number]  Signed on Workstation ID: [id number]   CT LIVER MULTIPHASIC W WO CONTRAST    Impression    IMPRESSION:    1.  Minimal enlargement of segment 6 lesion, currently measuring 1.5 cm,  previously 1.4 cm on 9/20/2024 MRI liver. This is categorized as a LI-RADS  5 and remains concerning for hepatocellular carcinoma given the arterial  enhancement and subsequent washout characteristics.  2.  Scattered hypodensities throughout the liver, the largest of which  measures up to 2.0 cm. Additional lesions are seen scattered throughout the  spleen. These findings are new from 9/20/2024 MRI liver and are concerning  for metastases.  3.  Cirrhotic liver morphology with findings of portal hypertension  including recanalized umbilical vein, large volume abdominal ascites, and  esophageal gastric varices.  4.  Small bilateral pleural effusions with adjacent compressive  atelectasis/consolidation, not significantly changed from 5/31/2025.  5.  Cholelithiasis.    I, Attending Radiologist Adam Bassett MD, have reviewed the images and  report and concur with these findings interpreted by Resident Radiologist,  Vince Díaz MD.    Electronically Signed by: Adam Bassett MD  Signed on: 6/9/2025 4:45 PM  Created on Workstation ID: [id number]  Signed on Workstation ID: [id number]        ANCILLARY ORDERS     Diet:  Cardiac; Yes, Medical Nutrition Management by Rd (Registered Dietitian) Diet  Telemetry: On  Consults:    IP CONSULT TO HEPATOLOGY  Therapy  Orders:   PT and OT Orders Placed this Encounter   Procedures    Occupational Therapy Evaluation    Occupational Therapy Treatment    Physical Therapy Evaluation    Physical Therapy Treatment       ADVANCED DIRECTIVES     Code Status: Full Resuscitation         ASSESSMENT AND PLAN     Fever, concern for sepsis, infectious workup initiated. Possible pneumonia. Continue with IV abx. Lactic acidosis has resolved. Blood cultures and paracentesis culture negative to date.   2. Recurrent large ascites requiring frequent paracentesis, EtOH/HBV cirrhosis, EV s/p banding in June 2024, mild portal hypertensive gastropathy. Paracentesis completed 6/8. Continue PPI, diuretics. Hepatology consult.   3. History of LLE DVT/PE on eliquis  4. Alcohol abuse, ongoing. Patient counseled on cessation. Thiamine, folate and CIWA monitoring.   5. Chronic anemia, thrombocytopenia secondary to alcohol related bone marrow suppression  6. Protein calorie malnutrition, moderate, BMI 18  7. Hypokalemia s/p replacement   PT/OT    Smoking status: Current Tobacco User    Nutrition status:  . Intervention:Coordination of Nutrition care by a nutritional Professional, Nutritional Supplemental therapy. See RD Note for Current recommendations.   Body mass index is 17.21 kg/m². - Patient is underweight with a BMI less than 18.5  DVT Prophylaxis: Eliquis       DISCHARGE PLANNING     The patient's treatment plans were discussed with patient.    Discharge Planning    Barriers to discharge: Patient is not medically ready and needs to remain in the hospital today due to acute illness  Anticipated discharge destination: TBD  Expected Discharge Date: 6/11/2025  PNA, SOB, IV abx, SIRS firing            Yaakov Abad MDHospitalist     no